# Patient Record
Sex: MALE | Race: WHITE | NOT HISPANIC OR LATINO | Employment: OTHER | URBAN - METROPOLITAN AREA
[De-identification: names, ages, dates, MRNs, and addresses within clinical notes are randomized per-mention and may not be internally consistent; named-entity substitution may affect disease eponyms.]

---

## 2021-08-18 PROBLEM — Z12.11 SCREENING FOR MALIGNANT NEOPLASM OF COLON: Status: ACTIVE | Noted: 2021-08-18

## 2021-08-18 PROBLEM — R15.1 FECAL SMEARING: Status: ACTIVE | Noted: 2021-08-18

## 2021-08-18 PROBLEM — R10.32 LEFT INGUINAL PAIN: Status: ACTIVE | Noted: 2021-08-18

## 2021-08-18 PROBLEM — Z80.0 FAMILY HISTORY OF COLON CANCER: Status: ACTIVE | Noted: 2021-08-18

## 2021-12-16 ENCOUNTER — EVALUATION (OUTPATIENT)
Dept: PHYSICAL THERAPY | Facility: CLINIC | Age: 66
End: 2021-12-16
Payer: COMMERCIAL

## 2021-12-16 DIAGNOSIS — G89.29 CHRONIC LOW BACK PAIN, UNSPECIFIED BACK PAIN LATERALITY, UNSPECIFIED WHETHER SCIATICA PRESENT: Primary | ICD-10-CM

## 2021-12-16 DIAGNOSIS — M54.50 CHRONIC LOW BACK PAIN, UNSPECIFIED BACK PAIN LATERALITY, UNSPECIFIED WHETHER SCIATICA PRESENT: Primary | ICD-10-CM

## 2021-12-16 PROCEDURE — 97162 PT EVAL MOD COMPLEX 30 MIN: CPT | Performed by: PHYSICAL THERAPIST

## 2021-12-16 PROCEDURE — 97110 THERAPEUTIC EXERCISES: CPT | Performed by: PHYSICAL THERAPIST

## 2021-12-21 ENCOUNTER — OFFICE VISIT (OUTPATIENT)
Dept: PHYSICAL THERAPY | Facility: CLINIC | Age: 66
End: 2021-12-21
Payer: COMMERCIAL

## 2021-12-21 DIAGNOSIS — G89.29 CHRONIC LOW BACK PAIN, UNSPECIFIED BACK PAIN LATERALITY, UNSPECIFIED WHETHER SCIATICA PRESENT: Primary | ICD-10-CM

## 2021-12-21 DIAGNOSIS — M54.50 CHRONIC LOW BACK PAIN, UNSPECIFIED BACK PAIN LATERALITY, UNSPECIFIED WHETHER SCIATICA PRESENT: Primary | ICD-10-CM

## 2021-12-21 PROCEDURE — 97110 THERAPEUTIC EXERCISES: CPT

## 2021-12-21 PROCEDURE — 97112 NEUROMUSCULAR REEDUCATION: CPT

## 2021-12-28 ENCOUNTER — OFFICE VISIT (OUTPATIENT)
Dept: PHYSICAL THERAPY | Facility: CLINIC | Age: 66
End: 2021-12-28
Payer: COMMERCIAL

## 2021-12-28 ENCOUNTER — APPOINTMENT (OUTPATIENT)
Dept: PHYSICAL THERAPY | Facility: CLINIC | Age: 66
End: 2021-12-28
Payer: COMMERCIAL

## 2021-12-28 DIAGNOSIS — G89.29 CHRONIC LOW BACK PAIN, UNSPECIFIED BACK PAIN LATERALITY, UNSPECIFIED WHETHER SCIATICA PRESENT: Primary | ICD-10-CM

## 2021-12-28 DIAGNOSIS — M54.50 CHRONIC LOW BACK PAIN, UNSPECIFIED BACK PAIN LATERALITY, UNSPECIFIED WHETHER SCIATICA PRESENT: Primary | ICD-10-CM

## 2021-12-28 PROCEDURE — 97112 NEUROMUSCULAR REEDUCATION: CPT

## 2021-12-28 PROCEDURE — 97110 THERAPEUTIC EXERCISES: CPT

## 2021-12-30 ENCOUNTER — OFFICE VISIT (OUTPATIENT)
Dept: PHYSICAL THERAPY | Facility: CLINIC | Age: 66
End: 2021-12-30
Payer: COMMERCIAL

## 2021-12-30 DIAGNOSIS — M54.50 CHRONIC LOW BACK PAIN, UNSPECIFIED BACK PAIN LATERALITY, UNSPECIFIED WHETHER SCIATICA PRESENT: Primary | ICD-10-CM

## 2021-12-30 DIAGNOSIS — G89.29 CHRONIC LOW BACK PAIN, UNSPECIFIED BACK PAIN LATERALITY, UNSPECIFIED WHETHER SCIATICA PRESENT: Primary | ICD-10-CM

## 2021-12-30 PROCEDURE — 97110 THERAPEUTIC EXERCISES: CPT

## 2021-12-30 PROCEDURE — 97112 NEUROMUSCULAR REEDUCATION: CPT

## 2022-01-03 ENCOUNTER — OFFICE VISIT (OUTPATIENT)
Dept: PHYSICAL THERAPY | Facility: CLINIC | Age: 67
End: 2022-01-03
Payer: COMMERCIAL

## 2022-01-03 DIAGNOSIS — G89.29 CHRONIC LOW BACK PAIN, UNSPECIFIED BACK PAIN LATERALITY, UNSPECIFIED WHETHER SCIATICA PRESENT: Primary | ICD-10-CM

## 2022-01-03 DIAGNOSIS — M54.50 CHRONIC LOW BACK PAIN, UNSPECIFIED BACK PAIN LATERALITY, UNSPECIFIED WHETHER SCIATICA PRESENT: Primary | ICD-10-CM

## 2022-01-03 PROCEDURE — 97110 THERAPEUTIC EXERCISES: CPT | Performed by: PHYSICAL THERAPIST

## 2022-01-03 PROCEDURE — 97112 NEUROMUSCULAR REEDUCATION: CPT | Performed by: PHYSICAL THERAPIST

## 2022-01-03 NOTE — PROGRESS NOTES
Daily Note     Today's date: 1/3/2022  Patient name: Mitul Bal  : 1955  MRN: [id number]  Referring provider: No ref  provider found  Dx:   Encounter Diagnosis     ICD-10-CM    1  Chronic low back pain, unspecified back pain laterality, unspecified whether sciatica present  M54 50     G89 29                   Subjective: Patient reports that he had some soreness on the w/e due to just "sitting around a lot"  He feels less discomfort with movement  Objective: See treatment diary below      Assessment: Tolerated treatment well  Patient would benefit from continued PT      Plan: Continue per plan of care  Precautions: Access Code: [email or web address]  URL: [email or web address]  Date: 2021  Prepared by: Eulalia Melendez    Exercises  Standing Lumbar Extension - 2 x daily - 7 x weekly - 1 sets - 5 reps - 5 hold  Prone Press Up on Elbows - 2 x daily - 7 x weekly - 1 sets - 10 reps - 10 hold        Manuals 12/16 12/21 12/28 12/30  1/3        JM R L5-S1 facet joint  MW TS MW 5'                                               Neuro Re-Ed                          SLS  10/10:  :10/10 10"   x 10  10 x :10        Side Stepping  5 laps  RTB 5 laps  RTB 5 laps  RTB 5 laps        CLamshells  2x10  RTB 2x10 GTB x 20 GTB x 20        PT  20x:05 20x:05  5" x 20 20 x :20        Piriformis stretch on R    5 x 15" 5 x :20        Bridge     10 x :03        PT with SLR     x10        Bike  6  min 6 min 6 min  6'                     Standing lumbar Extension - belt with OP 10 x :05 10x :05 prone 20x :05  Prone  Belt OP 1 x 10  X 10                     Prone Press ups 10 x :10 20x:10 20x :10 10" x 10  10 x :10        Open Book     nv                                  Ther Activity                                       Gait Training                                       Modalities

## 2022-01-05 ENCOUNTER — OFFICE VISIT (OUTPATIENT)
Dept: PHYSICAL THERAPY | Facility: CLINIC | Age: 67
End: 2022-01-05
Payer: COMMERCIAL

## 2022-01-05 DIAGNOSIS — G89.29 CHRONIC LOW BACK PAIN, UNSPECIFIED BACK PAIN LATERALITY, UNSPECIFIED WHETHER SCIATICA PRESENT: Primary | ICD-10-CM

## 2022-01-05 DIAGNOSIS — M54.50 CHRONIC LOW BACK PAIN, UNSPECIFIED BACK PAIN LATERALITY, UNSPECIFIED WHETHER SCIATICA PRESENT: Primary | ICD-10-CM

## 2022-01-05 PROCEDURE — 97112 NEUROMUSCULAR REEDUCATION: CPT

## 2022-01-05 PROCEDURE — 97110 THERAPEUTIC EXERCISES: CPT

## 2022-01-05 NOTE — PROGRESS NOTES
Daily Note     Today's date: 2022  Patient name: Logan Levy  : 1955  MRN: [id number]  Referring provider: No ref  provider found  Dx: No diagnosis found  Subjective: Patient denies change in status since last treatment  Slight discomfort with SLS on R       Objective: See treatment diary below      Assessment: Tolerated treatment well  Patient exhibited good technique with therapeutic exercises      Plan: Continue per plan of care  Precautions: Access Code: [email or web address]  URL: [email or web address]  Date: 2021  Prepared by: Kaiden Lowe    Exercises  Standing Lumbar Extension - 2 x daily - 7 x weekly - 1 sets - 5 reps - 5 hold  Prone Press Up on Elbows - 2 x daily - 7 x weekly - 1 sets - 10 reps - 10 hold        Manuals 12/16 12/21 12/28 12/30  1/3 1/5       JM R L5-S1 facet joint  MW TS MW 5'        (-) IASTM       5                                 Neuro Re-Ed                          SLS  10/10:  :10/10 10"   x 10  10 x :10 10" x 10        Side Stepping  5 laps  RTB 5 laps  RTB 5 laps  RTB 5 laps RTB 5 laps        CLamshells  2x10  RTB 2x10 GTB x 20 GTB x 20 GTB x 20        PT  20x:05 20x:05  5" x 20 20 x :20 5" x 20        Piriformis stretch on R    5 x 15" 5 x :20 5 x 20"        Bridge     10 x :03 3"x20        PT with SLR     x10        Bike  6  min 6 min 6 min  6' 6                    Standing lumbar Extension - belt with OP 10 x :05 10x :05 prone 20x :05  Prone  Belt OP 1 x 10  X 10 Belt OP x 10                     Prone Press ups 10 x :10 20x:10 20x :10 10" x 10  10 x :10 10" x 10       Open Book     nv R 10" x 5                                 Ther Activity                                       Gait Training                                       Modalities

## 2022-01-10 ENCOUNTER — OFFICE VISIT (OUTPATIENT)
Dept: PHYSICAL THERAPY | Facility: CLINIC | Age: 67
End: 2022-01-10
Payer: COMMERCIAL

## 2022-01-10 DIAGNOSIS — G89.29 CHRONIC LOW BACK PAIN, UNSPECIFIED BACK PAIN LATERALITY, UNSPECIFIED WHETHER SCIATICA PRESENT: Primary | ICD-10-CM

## 2022-01-10 DIAGNOSIS — M54.50 CHRONIC LOW BACK PAIN, UNSPECIFIED BACK PAIN LATERALITY, UNSPECIFIED WHETHER SCIATICA PRESENT: Primary | ICD-10-CM

## 2022-01-10 PROCEDURE — 97112 NEUROMUSCULAR REEDUCATION: CPT

## 2022-01-10 PROCEDURE — 97110 THERAPEUTIC EXERCISES: CPT

## 2022-01-10 NOTE — PROGRESS NOTES
Daily Note     Today's date: 1/10/2022  Patient name: Sean Harding  : 1955  MRN: [id number]  Referring provider: No ref  provider found  Dx:   Encounter Diagnosis     ICD-10-CM    1  Chronic low back pain, unspecified back pain laterality, unspecified whether sciatica present  M54 50     G89 29                   Subjective: Patient states that he was able to walk 2 miles over the weekend with minimal radiating paraesthesia at the end of his activity       Objective: See treatment diary below      Assessment: Tolerated treatment well  Patient exhibited good technique with therapeutic exercises      Plan: Continue per plan of care  Precautions: Access Code: [email or web address]  URL: [email or web address]  Date: 2021  Prepared by: Rhoda Lively    Exercises  Standing Lumbar Extension - 2 x daily - 7 x weekly - 1 sets - 5 reps - 5 hold  Prone Press Up on Elbows - 2 x daily - 7 x weekly - 1 sets - 10 reps - 10 hold        Manuals 12/16 12/21 12/28 12/30  1/3 1/5 1/10       JM R L5-S1 facet joint  MW TS MW 5'        (-) IASTM       5 5                                Neuro Re-Ed                          SLS  10/10:  :10/10 10"   x 10  10 x :10 10" x 10  10" x 10       Side Stepping  5 laps  RTB 5 laps  RTB 5 laps  RTB 5 laps RTB 5 laps  GTB 5 laps      CLamshells  2x10  RTB 2x10 GTB x 20 GTB x 20 GTB x 20  GTB x      PT  20x:05 20x:05  5" x 20 20 x :20 5" x 20  5" x20       Piriformis stretch on R    5 x 15" 5 x :20 5 x 20"  5 x 20"      Bridge     10 x :03 3"x20  5 " x20       PT with SLR     x10  2 x 10       Bike  6  min 6 min 6 min  6' 6 6                   Standing lumbar Extension - belt with OP 10 x :05 10x :05 prone 20x :05  Prone  Belt OP 1 x 10  X 10 Belt OP x 10  No belt 2 x10                   Prone Press ups 10 x :10 20x:10 20x :10 10" x 10  10 x :10 10" x 10 10" x 10       Open Book     nv R 10" x 5 R 10"x 5                                 Ther Activity Gait Training                                       Modalities

## 2022-01-12 ENCOUNTER — OFFICE VISIT (OUTPATIENT)
Dept: PHYSICAL THERAPY | Facility: CLINIC | Age: 67
End: 2022-01-12
Payer: COMMERCIAL

## 2022-01-12 DIAGNOSIS — G89.29 CHRONIC LOW BACK PAIN, UNSPECIFIED BACK PAIN LATERALITY, UNSPECIFIED WHETHER SCIATICA PRESENT: Primary | ICD-10-CM

## 2022-01-12 DIAGNOSIS — M54.50 CHRONIC LOW BACK PAIN, UNSPECIFIED BACK PAIN LATERALITY, UNSPECIFIED WHETHER SCIATICA PRESENT: Primary | ICD-10-CM

## 2022-01-12 PROCEDURE — 97110 THERAPEUTIC EXERCISES: CPT

## 2022-01-12 NOTE — PROGRESS NOTES
Daily Note     Today's date: 2022  Patient name: Sean Harding  : 1955  MRN: [id number]  Referring provider: No ref  provider found  Dx:   Encounter Diagnosis     ICD-10-CM    1  Chronic low back pain, unspecified back pain laterality, unspecified whether sciatica present  M54 50     G89 29                   Subjective: Some LLE pain following recreational walk  Objective: See treatment diary below      Assessment: Tolerated treatment well  Patient exhibited good technique with therapeutic exercises      Plan: Continue per plan of care  Precautions: Access Code: [email or web address]  URL: [email or web address]  Date: 2021  Prepared by: Rhoda Lively    Exercises  Standing Lumbar Extension - 2 x daily - 7 x weekly - 1 sets - 5 reps - 5 hold  Prone Press Up on Elbows - 2 x daily - 7 x weekly - 1 sets - 10 reps - 10 hold        Manuals 12/16 12/21 12/28 12/30  1/3 1/5 1/10  1/12     JM R L5-S1 facet joint  MW TS MW 5'   5 MW     (-) IASTM       5 5 5                               Neuro Re-Ed             Paloff press        R x 10      SLS  10/10:  :10/10 10"   x 10  10 x :10 10" x 10  10" x 10  10" x 10      Side Stepping  5 laps  RTB 5 laps  RTB 5 laps  RTB 5 laps RTB 5 laps  GTB 5 laps      CLamshells  2x10  RTB 2x10 GTB x 20 GTB x 20 GTB x 20  GTB x GTB x 20      PT  20x:05 20x:05  5" x 20 20 x :20 5" x 20  5" x20       Piriformis stretch on R    5 x 15" 5 x :20 5 x 20"  5 x 20" 20" x5      Bridge     10 x :03 3"x20  5 " x20  5" x 20     PT with SLR     x10  2 x 10  NV     Bike  6  min 6 min 6 min  6' 6 6 6                  Standing lumbar Extension - belt with OP 10 x :05 10x :05 prone 20x :05  Prone  Belt OP 1 x 10  X 10 Belt OP x 10  No belt 2 x10 No belt c 20                  Prone Press ups 10 x :10 20x:10 20x :10 10" x 10  10 x :10 10" x 10 10" x 10  10"x 10      Open Book     nv R 10" x 5 R 10"x 5                                 Ther Activity Gait Training                                       Modalities

## 2022-01-17 ENCOUNTER — OFFICE VISIT (OUTPATIENT)
Dept: PHYSICAL THERAPY | Facility: CLINIC | Age: 67
End: 2022-01-17
Payer: COMMERCIAL

## 2022-01-17 DIAGNOSIS — G89.29 CHRONIC LOW BACK PAIN, UNSPECIFIED BACK PAIN LATERALITY, UNSPECIFIED WHETHER SCIATICA PRESENT: Primary | ICD-10-CM

## 2022-01-17 DIAGNOSIS — M54.50 CHRONIC LOW BACK PAIN, UNSPECIFIED BACK PAIN LATERALITY, UNSPECIFIED WHETHER SCIATICA PRESENT: Primary | ICD-10-CM

## 2022-01-17 PROCEDURE — 97112 NEUROMUSCULAR REEDUCATION: CPT | Performed by: PHYSICAL THERAPIST

## 2022-01-17 PROCEDURE — 97110 THERAPEUTIC EXERCISES: CPT | Performed by: PHYSICAL THERAPIST

## 2022-01-17 NOTE — PROGRESS NOTES
Daily Note     Today's date: 2022  Patient name: Ray Barakat  : 1955  MRN: [id number]  Referring provider: No ref  provider found  Dx:   Encounter Diagnosis     ICD-10-CM    1  Chronic low back pain, unspecified back pain laterality, unspecified whether sciatica present  M54 50     G89 29                   Subjective: Patient reports that he has some L hip discomfort and mild tingling in his R toes when he sits in the car  Objective: See treatment diary below      Assessment: Tolerated treatment well  Patient would benefit from continued PT      Plan: Continue per plan of care  Precautions: Access Code: [email or web address]  URL: [email or web address]  Date: 2021  Prepared by: Denver Doe    Exercises  Standing Lumbar Extension - 2 x daily - 7 x weekly - 1 sets - 5 reps - 5 hold  Prone Press Up on Elbows - 2 x daily - 7 x weekly - 1 sets - 10 reps - 10 hold        Manuals 12/16 12/21 12/28 12/30  1/3 1/5 1/10  1/12 1/17    JM R L5-S1 facet joint  MW TS MW 5'   5 MW     (-) IASTM  L and R      5 5 5 6'                              Neuro Re-Ed             Paloff press        R x 10  RTB x 15 ea    SLS  10/10:  :10/10 10"   x 10  10 x :10 10" x 10  10" x 10  10" x 10  10 x :10    Side Stepping  5 laps  RTB 5 laps  RTB 5 laps  RTB 5 laps RTB 5 laps  GTB 5 laps  GTB 5 laps     CLamshells  2x10  RTB 2x10 GTB x 20 GTB x 20 GTB x 20  GTB x GTB x 20  GTB x 20    PT  20x:05 20x:05  5" x 20 20 x :20 5" x 20  5" x20       Piriformis stretch on R    5 x 15" 5 x :20 5 x 20"  5 x 20" 20" x5  5 x :20    Bridge     10 x :03 3"x20  5 " x20  5" x 20 20 x :05    PT with SLR     x10  2 x 10  NV     Bike  6  min 6 min 6 min  6' 6 6 6 6'                 Standing lumbar Extension - belt with OP 10 x :05 10x :05 prone 20x :05  Prone  Belt OP 1 x 10  X 10 Belt OP x 10  No belt 2 x10 No belt c 20 20                 Prone Press ups 10 x :10 20x:10 20x :10 10" x 10  10 x :10 10" x 10 10" x 10  10"x 10  10 x :10    Open Book     nv R 10" x 5  10"x 5 5 x :20 5 x :20                               Ther Activity                                       Gait Training                                       Modalities

## 2022-01-19 ENCOUNTER — OFFICE VISIT (OUTPATIENT)
Dept: PHYSICAL THERAPY | Facility: CLINIC | Age: 67
End: 2022-01-19
Payer: COMMERCIAL

## 2022-01-19 DIAGNOSIS — G89.29 CHRONIC LOW BACK PAIN, UNSPECIFIED BACK PAIN LATERALITY, UNSPECIFIED WHETHER SCIATICA PRESENT: Primary | ICD-10-CM

## 2022-01-19 DIAGNOSIS — M54.50 CHRONIC LOW BACK PAIN, UNSPECIFIED BACK PAIN LATERALITY, UNSPECIFIED WHETHER SCIATICA PRESENT: Primary | ICD-10-CM

## 2022-01-19 PROCEDURE — 97112 NEUROMUSCULAR REEDUCATION: CPT

## 2022-01-19 PROCEDURE — 97140 MANUAL THERAPY 1/> REGIONS: CPT

## 2022-01-19 PROCEDURE — 97110 THERAPEUTIC EXERCISES: CPT

## 2022-01-19 NOTE — PROGRESS NOTES
Daily Note     Today's date: 2022  Patient name: Mitzi Tidwell  : 1955  MRN: [id number]  Referring provider: No ref  provider found  Dx:   Encounter Diagnosis     ICD-10-CM    1  Chronic low back pain, unspecified back pain laterality, unspecified whether sciatica present  M54 50     G89 29                   Subjective: Patient states that he fell on ice yesterday onto his back  Patient states that he is sore since incident but no further complaints  Objective: See treatment diary below      Assessment: Tolerated treatment well  Patient exhibited good technique with therapeutic exercises      Plan: Continue per plan of care  Precautions: Access Code: [email or web address]  URL: [email or web address]  Date: 2021  Prepared by: Prema Austin    Exercises  Standing Lumbar Extension - 2 x daily - 7 x weekly - 1 sets - 5 reps - 5 hold  Prone Press Up on Elbows - 2 x daily - 7 x weekly - 1 sets - 10 reps - 10 hold        Manuals 12/16 12/21 12/28 12/30  1/3 1/5 1/10  1/12 1/17 1/19   JM R L5-S1 facet joint  MW TS MW 5'   5 MW     (-) IASTM  L and R      5 5 5 6' 10                              Neuro Re-Ed             Paloff press        R x 10  RTB x 15 ea NV   SLS  10/10:  :10/10 10"   x 10  10 x :10 10" x 10  10" x 10  10" x 10  10 x :10 10" x 10    Side Stepping  5 laps  RTB 5 laps  RTB 5 laps  RTB 5 laps RTB 5 laps  GTB 5 laps  GTB 5 laps  G 5 laps    CLamshells  2x10  RTB 2x10 GTB x 20 GTB x 20 GTB x 20  GTB x GTB x 20  GTB x 20 GTB x 20    PT  20x:05 20x:05  5" x 20 20 x :20 5" x 20  5" x20       Piriformis stretch on R    5 x 15" 5 x :20 5 x 20"  5 x 20" 20" x5  5 x :20 5 x 20"   Bridge     10 x :03 3"x20  5 " x20  5" x 20 20 x :05 5" x 20    PT with SLR     x10  2 x 10  NV     Bike  6  min 6 min 6 min  6' 6 6 6 6' 6                Standing lumbar Extension - belt with OP 10 x :05 10x :05 prone 20x :05  Prone  Belt OP 1 x 10  X 10 Belt OP x 10  No belt 2 x10 No belt c 20 20 20 Prone Press ups 10 x :10 20x:10 20x :10 10" x 10  10 x :10 10" x 10 10" x 10  10"x 10  10 x :10 10" x 10    Open Book     nv R 10" x 5  10"x 5 5 x :20 5 x :20 5 x 20"                              Ther Activity                                       Gait Training                                       Modalities

## 2022-01-24 ENCOUNTER — OFFICE VISIT (OUTPATIENT)
Dept: PHYSICAL THERAPY | Facility: CLINIC | Age: 67
End: 2022-01-24
Payer: COMMERCIAL

## 2022-01-24 DIAGNOSIS — M54.50 CHRONIC LOW BACK PAIN, UNSPECIFIED BACK PAIN LATERALITY, UNSPECIFIED WHETHER SCIATICA PRESENT: Primary | ICD-10-CM

## 2022-01-24 DIAGNOSIS — G89.29 CHRONIC LOW BACK PAIN, UNSPECIFIED BACK PAIN LATERALITY, UNSPECIFIED WHETHER SCIATICA PRESENT: Primary | ICD-10-CM

## 2022-01-24 PROCEDURE — 97112 NEUROMUSCULAR REEDUCATION: CPT | Performed by: PHYSICAL THERAPIST

## 2022-01-24 PROCEDURE — 97110 THERAPEUTIC EXERCISES: CPT | Performed by: PHYSICAL THERAPIST

## 2022-01-24 PROCEDURE — 97140 MANUAL THERAPY 1/> REGIONS: CPT | Performed by: PHYSICAL THERAPIST

## 2022-01-24 NOTE — PROGRESS NOTES
Daily Note     Today's date: 2022  Patient name: Phoebe Strong  : 1955  MRN: [id number]  Referring provider: No ref  provider found  Dx:   Encounter Diagnosis     ICD-10-CM    1  Chronic low back pain, unspecified back pain laterality, unspecified whether sciatica present  M54 50     G89 29                   Subjective: Patient reports that he has less LBP from fall however, he did shovel snow today  Also notes L hip continues to be painful  Objective: See treatment diary below  Supervised by AMARIS, PTA x 20'      Assessment: Tolerated treatment well  Patient would benefit from continued PT       Plan: Continue per plan of care  Precautions: Access Code: [email or web address]  URL: [email or web address]  Date: 2021  Prepared by: Albert Almendarez    Exercises  Standing Lumbar Extension - 2 x daily - 7 x weekly - 1 sets - 5 reps - 5 hold  Prone Press Up on Elbows - 2 x daily - 7 x weekly - 1 sets - 10 reps - 10 hold        Manuals 1/24 12/21 12/28 12/30  1/3 1/5 1/10  1/12 1/17 1/19   JM R L5-S1 facet joint  MW TS MW 5'   5 MW     (-) IASTM  L and R 9'     5 5 5 6' 10                              Neuro Re-Ed             Paloff press 15 ea RTB       R x 10  RTB x 15 ea NV   SLS 10 x :10 10/10:  :10/10 10"   x 10  10 x :10 10" x 10  10" x 10  10" x 10  10 x :10 10" x 10    Side Stepping GTB 5 laps 5 laps  RTB 5 laps  RTB 5 laps  RTB 5 laps RTB 5 laps  GTB 5 laps  GTB 5 laps  G 5 laps    CLamshells GTB x 20 2x10  RTB 2x10 GTB x 20 GTB x 20 GTB x 20  GTB x GTB x 20  GTB x 20 GTB x 20    PT  20x:05 20x:05  5" x 20 20 x :20 5" x 20  5" x20       Piriformis stretch on R    5 x 15" 5 x :20 5 x 20"  5 x 20" 20" x5  5 x :20 5 x 20"   Bridge 2x10    10 x :03 3"x20  5 " x20  5" x 20 20 x :05 5" x 20    PT with SLR     x10  2 x 10  NV     Bike 6' 6  min 6 min 6 min  6' 6 6 6 6' 6                Standing lumbar Extension - belt with OP 10 x :05 10x :05 prone 20x :05  Prone  Belt OP 1 x 10  X 10 Belt OP x 10  No belt 2 x10 No belt c 20 20 20                 Prone Press ups 10 x :10 20x:10 20x :10 10" x 10  10 x :10 10" x 10 10" x 10  10"x 10  10 x :10 10" x 10    Open Book 5 x :20    nv R 10" x 5  10"x 5 5 x :20 5 x :20 5 x 20"                              Ther Activity                                       Gait Training                                       Modalities

## 2022-01-26 ENCOUNTER — OFFICE VISIT (OUTPATIENT)
Dept: PHYSICAL THERAPY | Facility: CLINIC | Age: 67
End: 2022-01-26
Payer: COMMERCIAL

## 2022-01-26 DIAGNOSIS — G89.29 CHRONIC LOW BACK PAIN, UNSPECIFIED BACK PAIN LATERALITY, UNSPECIFIED WHETHER SCIATICA PRESENT: Primary | ICD-10-CM

## 2022-01-26 DIAGNOSIS — M54.50 CHRONIC LOW BACK PAIN, UNSPECIFIED BACK PAIN LATERALITY, UNSPECIFIED WHETHER SCIATICA PRESENT: Primary | ICD-10-CM

## 2022-01-26 PROCEDURE — 97140 MANUAL THERAPY 1/> REGIONS: CPT | Performed by: PHYSICAL THERAPIST

## 2022-01-26 PROCEDURE — 97112 NEUROMUSCULAR REEDUCATION: CPT | Performed by: PHYSICAL THERAPIST

## 2022-01-26 NOTE — LETTER
2022    [phone]    Patient: Mitzi Tidwell   YOB: 1955   Date of Visit: 2022     Encounter Diagnosis     ICD-10-CM    1  Chronic low back pain, unspecified back pain laterality, unspecified whether sciatica present  M54 50     G89 29        Dear Dr Gosia Goodwin: Thank you for your recent referral of Mitzi Tidwell  Please review the attached evaluation summary from [address] [place] [address] recent visit  Please verify that you agree with the plan of care by signing the attached order  If you have any questions or concerns, please do not hesitate to call  I sincerely appreciate the opportunity to share in the care of one of your patients and hope to have another opportunity to work with you in the near future  Sincerely,    Denita Huston, PT      Referring Provider:      I certify that I have read the below Plan of Care and certify the need for these services furnished under this plan of treatment while under my care  Verónica Kathleen MD  [address] [place] [address]  Via Fax: [phone]          Daily Note     Today's date: 2022  Patient name: Mitzi Tidwell  : 1955  MRN: [id number]  Referring provider: No ref  provider found  Dx:   Encounter Diagnosis     ICD-10-CM    1  Chronic low back pain, unspecified back pain laterality, unspecified whether sciatica present  M54 50     G89 29                   Subjective: See REV      Objective: See treatment diary below      Assessment: Tolerated treatment well  Patient would benefit from continued PT      Plan: Continue per plan of care  Precautions: Access Code: [email or web address]  URL: [email or web address]  Date: 2021  Prepared by: Prema Austin    Exercises  Standing Lumbar Extension - 2 x daily - 7 x weekly - 1 sets - 5 reps - 5 hold  Prone Press Up on Elbows - 2 x daily - 7 x weekly - 1 sets - 10 reps - 10 hold        Manuals  JM R L5-S1 facet joint  MW           (-) IASTM  L and R 9' 6'           REV  10                        Neuro Re-Ed             Paloff press 15 ea RTB RTB x 20           SLS 10 x :10 10/10:            Side Stepping GTB 5 laps 5 laps            CLamshells GTB x 20 2x10 GTB           PT  20x:05           Piriformis stretch on R             Bridge 2x10 10 x GTB :05           PT with SLR  2 x 10           Bike 6' 6  min           Hamstring st  5 x :20           Standing lumbar Extension - belt with OP 10 x :05 X 20                        Prone Press ups 10 x :10 20x:10           Open Book 5 x :20 5 x :20                                      Ther Activity                                       Gait Training                                       Modalities                                              PT Evaluation     Today's date: 22  Patient name: Lindsey Freeman  : 1955  MRN: [id number]  Referring provider: No ref  provider found  Dx:   Encounter Diagnosis     ICD-10-CM    1  Chronic low back pain, unspecified back pain laterality, unspecified whether sciatica present  M54 50     G89 29                   Assessment  Assessment details: Lindsey Freeman is a 73 y o  male with Chronic low back pain possibly related to facet joint irritation on the R  He presents with pain, decreased strength, decreased ROM, impaired sensation, ambulatory dysfunction and postural  dysfunction  Due to these impairments, Patient has difficulty performing a/iadls, recreational activities and engaging in social activities  Patient's clinical presentation is consistent with their referring diagnosis  Patient would benefit from skilled physical therapy to address their aforementioned impairments, improve their level of function and to improve their overall quality of life   has been given a home exercise program and is in agreement with the plan of care    Thank you for your referral   Impairments: abnormal or restricted ROM, impaired balance, lacks appropriate home exercise program and pain with function  Understanding of Dx/Px/POC: excellent  Goals  ST Goals - 2-4 weeks  1  Patient will report decreased pain with activity by at least 2 points within 4 weeks  2  Patient will improve ROM 5-10 degrees within 4 weeks  3  Patient will abolish radicular sx in 2 weeks  4  Patient will perform IADLs without pain in 2 weeks  5  Patient will increase strength by 25% in 4 weeks    LT Goals - Discharge  1  Patient will improve FOTO score to maximum stated or greater by discharge  2  Patient will return to preferred recreational activity without significant pain increase by discharge   3  Patient will return to all house work related activities without pain by discharge     Plan  Patient would benefit from: skilled physical therapy  Referral necessary: Yes  Planned therapy interventions: strengthening, stretching, therapeutic activities, therapeutic exercise, home exercise program, balance, joint mobilization and manual therapy  Frequency: 2x week  Duration in visits: 20  Duration in weeks: 20  Plan of Care beginning date: 12/16/2021  Plan of Care expiration date: 3/16/2022  Treatment plan discussed with: patient        Subjective Evaluation    History of Present Illness  Mechanism of injury: Patient reports that he has had off and on LBP since he was in his 25s but approx 1 5 ago he began having insidious onset of LBP  He states that he went for a walk and that he got out of his car he felt pain  He had attempted to jog during that walk and went approx   5 miles  CC:  He reports R sided LBP  He reports that he has no pain with sitting at this time  He notes having a "numbness" in his foot when he is driving longer distances - for 39' or more  He also notes the numbness when he has walked approx 1-1 5 mile  He indicates sx are in the medial arch area and into the great toe  Sx resolve very quickly    PMH:  L femur fx and meniscus surgery - 2 years ago; Function:  He is retired  He works out everyday  He notes that sit to stand is most painful  He denies pain at night or bowel and bladder changes  He continues to try to do a daily walk/jog 5 days a week  2 miles a day          Recurrent probem    Quality of life: excellent    Pain  Current pain ratin / 0/10  At best pain ratin  At worst pain ratin  / 3/10  Quality: sharp  Relieving factors: heat and medications  Aggravating factors: walking and standing  Progression: no change    Social Support  Steps to enter house: no  Stairs in house: yes   Lives in: [place]  Lives with: spouse      Diagnostic Tests  X-ray: abnormal    FCE comments: Spondylitic changes at [id number] Goals  Patient goals for therapy: return to work, increased motion, decreased pain, independence with ADLs/IADLs and return to sport/leisure activities          Objective     Static Posture     Lumbar Spine   Decreased lordosis  Pelvis   Pelvis (Right): Elevated  Palpation     Right   Tenderness of the quadratus lumborum  Tenderness     Lumbar Spine  Tenderness in the facet joint  Additional Tenderness Details  L5-S1 R facet joint tenderness    Active Range of Motion     Lumbar   Flexion: 90 degrees    Extension: 15 degrees   Left lateral flexion: 22 degrees  /    20 degrees      Right lateral flexion: 15 degrees  with pain  / 22 degrees    Additional Active Range of Motion Details  Patient notes tingling  In foot with standing extension    Strength/Myotome Testing     Right Ankle/Foot   Great toe flexion: 4+    Tests     Lumbar     Right   Negative slump test      Left Hip   Positive CLAUDIA  Right Hip   Positive CLAUDIA  Additional Tests Details  LBP with Joe Tiffany    Ambulation     Observational Gait     Additional Observational Gait Details  Patient with antalgia due to previous L femur ORIF      Patient appears to have a + trendelenberg      Functional Assessment        Single Leg Stance - Eyes Open   Left  Trial 1: 5 seconds    /  10 sec    Right  Trial 1: 6 seconds   /  10 sec    General Comments:    Lower quarter screen   Hips: unremarkable  Knees: unremarkable  Foot/ankle: unremarkable    Ankle/Foot Comments   Great toe on R:  4+/5

## 2022-01-26 NOTE — PROGRESS NOTES
PT Evaluation     Today's date: 22  Patient name: Janessa Schreiber  : 1955  MRN: [id number]  Referring provider: No ref  provider found  Dx:   Encounter Diagnosis     ICD-10-CM    1  Chronic low back pain, unspecified back pain laterality, unspecified whether sciatica present  M54 50     G89 29                   Assessment  Assessment details: Janessa Schreiber is a 73 y o  male with Chronic low back pain possibly related to facet joint irritation on the R  He presents with pain, decreased strength, decreased ROM, impaired sensation, ambulatory dysfunction and postural  dysfunction  Due to these impairments, Patient has difficulty performing a/iadls, recreational activities and engaging in social activities  Patient's clinical presentation is consistent with their referring diagnosis  Patient would benefit from skilled physical therapy to address their aforementioned impairments, improve their level of function and to improve their overall quality of life   has been given a home exercise program and is in agreement with the plan of care  Thank you for your referral   Impairments: abnormal or restricted ROM, impaired balance, lacks appropriate home exercise program and pain with function  Understanding of Dx/Px/POC: excellent  Goals  ST Goals - 2-4 weeks  1  Patient will report decreased pain with activity by at least 2 points within 4 weeks  2  Patient will improve ROM 5-10 degrees within 4 weeks  3  Patient will abolish radicular sx in 2 weeks  4  Patient will perform IADLs without pain in 2 weeks  5  Patient will increase strength by 25% in 4 weeks    LT Goals - Discharge  1  Patient will improve FOTO score to maximum stated or greater by discharge  2  Patient will return to preferred recreational activity without significant pain increase by discharge   3    Patient will return to all house work related activities without pain by discharge     Plan  Patient would benefit from: skilled physical therapy  Referral necessary: Yes  Planned therapy interventions: strengthening, stretching, therapeutic activities, therapeutic exercise, home exercise program, balance, joint mobilization and manual therapy  Frequency: 2x week  Duration in visits: 20  Duration in weeks: 20  Plan of Care beginning date: 2021  Plan of Care expiration date: 3/16/2022  Treatment plan discussed with: patient        Subjective Evaluation    History of Present Illness  Mechanism of injury: Patient reports that he has had off and on LBP since he was in his 25s but approx 1 5 ago he began having insidious onset of LBP  He states that he went for a walk and that he got out of his car he felt pain  He had attempted to jog during that walk and went approx   5 miles  CC:  He reports R sided LBP  He reports that he has no pain with sitting at this time  He notes having a "numbness" in his foot when he is driving longer distances - for 39' or more  He also notes the numbness when he has walked approx 1-1 5 mile  He indicates sx are in the medial arch area and into the great toe  Sx resolve very quickly  PMH:  L femur fx and meniscus surgery - 2 years ago; Function:  He is retired  He works out everyday  He notes that sit to stand is most painful  He denies pain at night or bowel and bladder changes      He continues to try to do a daily walk/jog 5 days a week  2 miles a day          Recurrent probem    Quality of life: excellent    Pain  Current pain ratin / 0/10  At best pain ratin  At worst pain ratin  / 3/10  Quality: sharp  Relieving factors: heat and medications  Aggravating factors: walking and standing  Progression: no change    Social Support  Steps to enter house: no  Stairs in house: yes   Lives in: [facility]  Lives with: spouse      Diagnostic Tests  X-ray: abnormal    FCE comments: Spondylitic changes at L5-[id number] Goals  Patient goals for therapy: return to work, increased motion, decreased pain, independence with ADLs/IADLs and return to sport/leisure activities          Objective     Static Posture     Lumbar Spine   Decreased lordosis  Pelvis   Pelvis (Right): Elevated  Palpation     Right   Tenderness of the quadratus lumborum  Tenderness     Lumbar Spine  Tenderness in the facet joint  Additional Tenderness Details  L5-S1 R facet joint tenderness    Active Range of Motion     Lumbar   Flexion: 90 degrees    Extension: 15 degrees   Left lateral flexion: 22 degrees  /    20 degrees      Right lateral flexion: 15 degrees  with pain  / 22 degrees    Additional Active Range of Motion Details  Patient notes tingling  In foot with standing extension    Strength/Myotome Testing     Right Ankle/Foot   Great toe flexion: 4+    Tests     Lumbar     Right   Negative slump test      Left Hip   Positive CLAUDIA  Right Hip   Positive CLAUDIA  Additional Tests Details  LBP with Marielos Rashel    Ambulation     Observational Gait     Additional Observational Gait Details  Patient with antalgia due to previous L femur ORIF      Patient appears to have a + trendelenberg      Functional Assessment        Single Leg Stance - Eyes Open   Left  Trial 1: 5 seconds    /  10 sec    Right  Trial 1: 6 seconds   /  10 sec    General Comments:    Lower quarter screen   Hips: unremarkable  Knees: unremarkable  Foot/ankle: unremarkable    Ankle/Foot Comments   Great toe on R:  4+/5

## 2022-01-26 NOTE — LETTER
2022    [phone]    Patient: Shin Kyle   YOB: 1955   Date of Visit: 2022     Encounter Diagnosis     ICD-10-CM    1  Chronic low back pain, unspecified back pain laterality, unspecified whether sciatica present  M54 50     G89 29        Dear Dr Ashley Mendez: Thank you for your recent referral of Shin Kyle  Please review the attached evaluation summary from [address] [place] [address] recent visit  Please verify that you agree with the plan of care by signing the attached order  If you have any questions or concerns, please do not hesitate to call  I sincerely appreciate the opportunity to share in the care of one of your patients and hope to have another opportunity to work with you in the near future  Sincerely,    Radha Corbin, PT      Referring Provider:      I certify that I have read the below Plan of Care and certify the need for these services furnished under this plan of treatment while under my care  Iain Phan MD  [address] [place] [id number]  Via Fax: [phone]          Daily Note     Today's date: 2022  Patient name: Shin Kyle  : 1955  MRN: [id number]  Referring provider: No ref  provider found  Dx:   Encounter Diagnosis     ICD-10-CM    1  Chronic low back pain, unspecified back pain laterality, unspecified whether sciatica present  M54 50     G89 29                   Subjective: See REV      Objective: See treatment diary below      Assessment: Tolerated treatment well  Patient would benefit from continued PT      Plan: Continue per plan of care  Precautions: Access Code: [email or web address]  URL: [email or web address]  Date: 2021  Prepared by: Petrona Colin    Exercises  Standing Lumbar Extension - 2 x daily - 7 x weekly - 1 sets - 5 reps - 5 hold  Prone Press Up on Elbows - 2 x daily - 7 x weekly - 1 sets - 10 reps - 10 hold        Manuals  JM R L5-S1 facet joint  MW           (-) IASTM  L and R 9' 6'           REV  10                        Neuro Re-Ed             Paloff press 15 ea RTB RTB x 20           SLS 10 x :10 10/10:            Side Stepping GTB 5 laps 5 laps            CLamshells GTB x 20 2x10 GTB           PT  20x:05           Piriformis stretch on R             Bridge 2x10 10 x GTB :05           PT with SLR  2 x 10           Bike 6' 6  min           Hamstring st  5 x :20           Standing lumbar Extension - belt with OP 10 x :05 X 20                        Prone Press ups 10 x :10 20x:10           Open Book 5 x :20 5 x :20                                      Ther Activity                                       Gait Training                                       Modalities                                              PT Evaluation     Today's date: 22  Patient name: Janessa Schreiber  : 1955  MRN: [id number]  Referring provider: No ref  provider found  Dx:   Encounter Diagnosis     ICD-10-CM    1  Chronic low back pain, unspecified back pain laterality, unspecified whether sciatica present  M54 50     G89 29                   Assessment  Assessment details: Janessa Schreiber is a 73 y o  male with Chronic low back pain possibly related to facet joint irritation on the R  He presents with pain, decreased strength, decreased ROM, impaired sensation, ambulatory dysfunction and postural  dysfunction  Due to these impairments, Patient has difficulty performing a/iadls, recreational activities and engaging in social activities  Patient's clinical presentation is consistent with their referring diagnosis  Patient would benefit from skilled physical therapy to address their aforementioned impairments, improve their level of function and to improve their overall quality of life   has been given a home exercise program and is in agreement with the plan of care    Thank you for your referral   Impairments: abnormal or restricted ROM, impaired balance, lacks appropriate home exercise program and pain with function  Understanding of Dx/Px/POC: excellent  Goals  ST Goals - 2-4 weeks  1  Patient will report decreased pain with activity by at least 2 points within 4 weeks  2  Patient will improve ROM 5-10 degrees within 4 weeks  3  Patient will abolish radicular sx in 2 weeks  4  Patient will perform IADLs without pain in 2 weeks  5  Patient will increase strength by 25% in 4 weeks    LT Goals - Discharge  1  Patient will improve FOTO score to maximum stated or greater by discharge  2  Patient will return to preferred recreational activity without significant pain increase by discharge   3  Patient will return to all house work related activities without pain by discharge     Plan  Patient would benefit from: skilled physical therapy  Referral necessary: Yes  Planned therapy interventions: strengthening, stretching, therapeutic activities, therapeutic exercise, home exercise program, balance, joint mobilization and manual therapy  Frequency: 2x week  Duration in visits: 20  Duration in weeks: 20  Plan of Care beginning date: 12/16/2021  Plan of Care expiration date: 3/16/2022  Treatment plan discussed with: patient        Subjective Evaluation    History of Present Illness  Mechanism of injury: Patient reports that he has had off and on LBP since he was in his 25s but approx 1 5 ago he began having insidious onset of LBP  He states that he went for a walk and that he got out of his car he felt pain  He had attempted to jog during that walk and went approx   5 miles  CC:  He reports R sided LBP  He reports that he has no pain with sitting at this time  He notes having a "numbness" in his foot when he is driving longer distances - for 39' or more  He also notes the numbness when he has walked approx 1-1 5 mile  He indicates sx are in the medial arch area and into the great toe  Sx resolve very quickly    PMH:  L femur fx and meniscus surgery - 2 years ago; Function:  He is retired  He works out everyday  He notes that sit to stand is most painful  He denies pain at night or bowel and bladder changes  He continues to try to do a daily walk/jog 5 days a week  2 miles a day          Recurrent probem    Quality of life: excellent    Pain  Current pain ratin / 0/10  At best pain ratin  At worst pain ratin  / 3/10  Quality: sharp  Relieving factors: heat and medications  Aggravating factors: walking and standing  Progression: no change    Social Support  Steps to enter house: no  Stairs in house: yes   Lives in: [facility]  Lives with: spouse      Diagnostic Tests  X-ray: abnormal    FCE comments: Spondylitic changes at L5-F7Fusovpu Goals  Patient goals for therapy: return to work, increased motion, decreased pain, independence with ADLs/IADLs and return to sport/leisure activities          Objective     Static Posture     Lumbar Spine   Decreased lordosis  Pelvis   Pelvis (Right): Elevated  Palpation     Right   Tenderness of the quadratus lumborum  Tenderness     Lumbar Spine  Tenderness in the facet joint  Additional Tenderness Details  L5-S1 R facet joint tenderness    Active Range of Motion     Lumbar   Flexion: 90 degrees    Extension: 15 degrees   Left lateral flexion: 22 degrees  /    20 degrees      Right lateral flexion: 15 degrees  with pain  / 22 degrees    Additional Active Range of Motion Details  Patient notes tingling  In foot with standing extension    Strength/Myotome Testing     Right Ankle/Foot   Great toe flexion: 4+    Tests     Lumbar     Right   Negative slump test      Left Hip   Positive CLAUDIA  Right Hip   Positive CLAUDIA  Additional Tests Details  LBP with Ochoa Galena    Ambulation     Observational Gait     Additional Observational Gait Details  Patient with antalgia due to previous L femur ORIF      Patient appears to have a + trendelenberg      Functional Assessment        Single Leg Stance - Eyes Open   Left  Trial 1: 5 seconds    /  10 sec    Right  Trial 1: 6 seconds   /  10 sec    General Comments:    Lower quarter screen   Hips: unremarkable  Knees: unremarkable  Foot/ankle: unremarkable    Ankle/Foot Comments   Great toe on R:  4+/5

## 2022-01-26 NOTE — PROGRESS NOTES
Daily Note     Today's date: 2022  Patient name: Lubna Friend  : 1955  MRN: [id number]  Referring provider: No ref  provider found  Dx:   Encounter Diagnosis     ICD-10-CM    1  Chronic low back pain, unspecified back pain laterality, unspecified whether sciatica present  M54 50     G89 29                   Subjective: See REV      Objective: See treatment diary below      Assessment: Tolerated treatment well  Patient would benefit from continued PT      Plan: Continue per plan of care  Precautions: Access Code: [email or web address]  URL: [email or web address]  Date: 2021  Prepared by: Amada Plum    Exercises  Standing Lumbar Extension - 2 x daily - 7 x weekly - 1 sets - 5 reps - 5 hold  Prone Press Up on Elbows - 2 x daily - 7 x weekly - 1 sets - 10 reps - 10 hold        Manuals            JM R L5-S1 facet joint  MW           (-) IASTM  L and R 9' 6'           REV  10                        Neuro Re-Ed             Paloff press 15 ea RTB RTB x 20           SLS 10 x :10 10/10:            Side Stepping GTB 5 laps 5 laps            CLamshells GTB x 20 2x10 GTB           PT  20x:05           Piriformis stretch on R             Bridge 2x10 10 x GTB :05           PT with SLR  2 x 10           Bike 6' 6  min           Hamstring st  5 x :20           Standing lumbar Extension - belt with OP 10 x :05 X 20                        Prone Press ups 10 x :10 20x:10           Open Book 5 x :20 5 x :20                                      Ther Activity                                       Gait Training                                       Modalities

## 2022-01-31 ENCOUNTER — OFFICE VISIT (OUTPATIENT)
Dept: PHYSICAL THERAPY | Facility: CLINIC | Age: 67
End: 2022-01-31
Payer: COMMERCIAL

## 2022-01-31 DIAGNOSIS — G89.29 CHRONIC LOW BACK PAIN, UNSPECIFIED BACK PAIN LATERALITY, UNSPECIFIED WHETHER SCIATICA PRESENT: Primary | ICD-10-CM

## 2022-01-31 DIAGNOSIS — M54.50 CHRONIC LOW BACK PAIN, UNSPECIFIED BACK PAIN LATERALITY, UNSPECIFIED WHETHER SCIATICA PRESENT: Primary | ICD-10-CM

## 2022-01-31 PROCEDURE — 97112 NEUROMUSCULAR REEDUCATION: CPT

## 2022-01-31 PROCEDURE — 97110 THERAPEUTIC EXERCISES: CPT

## 2022-01-31 NOTE — PROGRESS NOTES
Daily Note     Today's date: 2022  Patient name: Fernando Jennings  : 1955  MRN: [id number]  Referring provider: No ref  provider found  Dx:   Encounter Diagnosis     ICD-10-CM    1  Chronic low back pain, unspecified back pain laterality, unspecified whether sciatica present  M54 50     G89 29                   Subjective: No change in status since last visit  Patient did not walk recreationally at home this weekend due to weather  Objective: See treatment diary below      Assessment: Tolerated treatment well  Patient exhibited good technique with therapeutic exercises      Plan: Continue per plan of care  Precautions: Access Code: [email or web address]  URL: [email or web address]  Date: 2021  Prepared by: Almas Arceo    Exercises  Standing Lumbar Extension - 2 x daily - 7 x weekly - 1 sets - 5 reps - 5 hold  Prone Press Up on Elbows - 2 x daily - 7 x weekly - 1 sets - 10 reps - 10 hold        Manuals           CONRAD GARCIA L5-S1 facet joint  MW           (-) IASTM  L and R 9' 6' 6          REV  10                        Neuro Re-Ed             Paloff press 15 ea RTB RTB x 20 RTB x 20           SLS 10 x :10 10/10:  10"/10          Side Stepping GTB 5 laps 5 laps  GTB 5 laps           CLamshells GTB x 20 2x10 GTB GTB x 20          PT  20x:05 5" x 20           Piriformis stretch on R             Bridge 2x10 10 x GTB :05 GTB 5" x 20          PT with SLR  2 x 10 1 5# 2 x 10          Bike 6' 6  min 7 min           Hamstring st  5 x :20 Strap 5 x 20"          Standing lumbar Extension - belt with OP 10 x :05 X 20 X 20           Standing hip abduction and extension   GTB x 20           Prone Press ups 10 x :10 20x:10 10"  10          Open Book 5 x :20 5 x :20 5 x 20"                                     Ther Activity                                       Gait Training                                       Modalities

## 2022-02-02 ENCOUNTER — OFFICE VISIT (OUTPATIENT)
Dept: PHYSICAL THERAPY | Facility: CLINIC | Age: 67
End: 2022-02-02
Payer: COMMERCIAL

## 2022-02-02 DIAGNOSIS — M54.50 CHRONIC LOW BACK PAIN, UNSPECIFIED BACK PAIN LATERALITY, UNSPECIFIED WHETHER SCIATICA PRESENT: Primary | ICD-10-CM

## 2022-02-02 DIAGNOSIS — G89.29 CHRONIC LOW BACK PAIN, UNSPECIFIED BACK PAIN LATERALITY, UNSPECIFIED WHETHER SCIATICA PRESENT: Primary | ICD-10-CM

## 2022-02-02 PROCEDURE — 97112 NEUROMUSCULAR REEDUCATION: CPT

## 2022-02-02 PROCEDURE — 97110 THERAPEUTIC EXERCISES: CPT

## 2022-02-02 NOTE — PROGRESS NOTES
Daily Note     Today's date: 2022  Patient name: Jayna Chavez  : 1955  MRN: [id number]  Referring provider: No ref  provider found  Dx: No diagnosis found  Subjective: Patient continues to notice some discomfort in LB after sitting for long periods of time  Objective: See treatment diary below      Assessment: Tolerated treatment well  Patient exhibited good technique with therapeutic exercises      Plan: Continue per plan of care  Precautions: Access Code: [email or web address]  URL: [email or web address]  Date: 2021  Prepared by: Jocelyn Burris    Exercises  Standing Lumbar Extension - 2 x daily - 7 x weekly - 1 sets - 5 reps - 5 hold  Prone Press Up on Elbows - 2 x daily - 7 x weekly - 1 sets - 10 reps - 10 hold        Manuals          JM R L5-S1 facet joint  MW           (-) IASTM  L and R 9' 6' 6 3         REV  10                        Neuro Re-Ed             Paloff press 15 ea RTB RTB x 20 RTB x 20  GTB x 20          SLS 10 x :10 10/10:  10"/10 10" x 10          Side Stepping GTB 5 laps 5 laps  GTB 5 laps  GTB x 5 laps          CLamshells GTB x 20 2x10 GTB GTB x 20 GTB x 20          PT  20x:05 5" x 20           Piriformis stretch on R             Bridge 2x10 10 x GTB :05 GTB 5" x 20 GTB 5' x 20          PT with SLR  2 x 10 1 5# 2 x 10 1 5# x 20         Bike 6' 6  min 7 min  7         Hamstring st  5 x :20 Strap 5 x 20" Strap' 5 x 20"          Standing lumbar Extension - belt with OP 10 x :05 X 20 X 20  X 20          Standing hip abduction and extension   GTB x 20  GTB x 20         Prone Press ups 10 x :10 20x:10 10"  10 10"x 10          Open Book 5 x :20 5 x :20 5 x 20" 5 x 20"         Total gym squats   l lvl 22 3 x 10                        Ther Activity                                       Gait Training                                       Modalities

## 2022-02-07 ENCOUNTER — OFFICE VISIT (OUTPATIENT)
Dept: PHYSICAL THERAPY | Facility: CLINIC | Age: 67
End: 2022-02-07
Payer: COMMERCIAL

## 2022-02-07 DIAGNOSIS — M54.50 CHRONIC LOW BACK PAIN, UNSPECIFIED BACK PAIN LATERALITY, UNSPECIFIED WHETHER SCIATICA PRESENT: Primary | ICD-10-CM

## 2022-02-07 DIAGNOSIS — G89.29 CHRONIC LOW BACK PAIN, UNSPECIFIED BACK PAIN LATERALITY, UNSPECIFIED WHETHER SCIATICA PRESENT: Primary | ICD-10-CM

## 2022-02-07 PROCEDURE — 97110 THERAPEUTIC EXERCISES: CPT

## 2022-02-07 NOTE — PROGRESS NOTES
Daily Note     Today's date: 2022  Patient name: Chichi Puentes  : 1955  MRN: [id number]  Referring provider: No ref  provider found  Dx: No diagnosis found  Subjective: Continues to notice stiffness in LB after sitting for any length of time or walking for long periods of time (over 3 miles)      Objective: See treatment diary below      Assessment: Tolerated treatment well  Patient exhibited good technique with therapeutic exercises      Plan: Continue per plan of care  Precautions: Access Code: [email or web address]  URL: [email or web address]  Date: 2021  Prepared by: Rasta Huynh    Exercises  Standing Lumbar Extension - 2 x daily - 7 x weekly - 1 sets - 5 reps - 5 hold  Prone Press Up on Elbows - 2 x daily - 7 x weekly - 1 sets - 10 reps - 10 hold        Manuals         JM R L5-S1 facet joint  MW           (-) IASTM  L and R 9' 6' 6 3 3        REV  10                        Neuro Re-Ed             Paloff press 15 ea RTB RTB x 20 RTB x 20  GTB x 20  GTb 5" x 20        SLS 10 x :10 10/10:  10"/10 10" x 10  10" x 5 foam         Side Stepping GTB 5 laps 5 laps  GTB 5 laps  GTB x 5 laps  GTB 5 laps         CLamshells GTB x 20 2x10 GTB GTB x 20 GTB x 20  GTB 20         PT  20x:05 5" x 20           Piriformis stretch on R             Bridge 2x10 10 x GTB :05 GTB 5" x 20 GTB 5' x 20  GTB x 20         PT with SLR  2 x 10 1 5# 2 x 10 1 5# x 20 2# 2 x 10        Bike 6' 6  min 7 min  7 7        Hamstring st  5 x :20 Strap 5 x 20" Strap' 5 x 20"  Strap 5 x 20"        Standing lumbar Extension - belt with OP 10 x :05 X 20 X 20  X 20          Standing hip abduction and extension   GTB x 20  GTB x 20 GTB x 20         Prone Press ups 10 x 20  :10 20x:10 10"  10 10"x 10  10" x 10         Open Book 5 x :20 5 x :20 5 x 20" 5 x 20" 5 x 20"        Total gym squats   l lvl 22 3 x 10  lvl 22 x 30                       Ther Activity                                       Gait Training                                       Modalities

## 2022-02-09 ENCOUNTER — OFFICE VISIT (OUTPATIENT)
Dept: PHYSICAL THERAPY | Facility: CLINIC | Age: 67
End: 2022-02-09
Payer: COMMERCIAL

## 2022-02-09 DIAGNOSIS — G89.29 CHRONIC LOW BACK PAIN, UNSPECIFIED BACK PAIN LATERALITY, UNSPECIFIED WHETHER SCIATICA PRESENT: Primary | ICD-10-CM

## 2022-02-09 DIAGNOSIS — M54.50 CHRONIC LOW BACK PAIN, UNSPECIFIED BACK PAIN LATERALITY, UNSPECIFIED WHETHER SCIATICA PRESENT: Primary | ICD-10-CM

## 2022-02-09 PROCEDURE — 97530 THERAPEUTIC ACTIVITIES: CPT | Performed by: PHYSICAL THERAPIST

## 2022-02-09 PROCEDURE — 97112 NEUROMUSCULAR REEDUCATION: CPT | Performed by: PHYSICAL THERAPIST

## 2022-02-09 PROCEDURE — 97110 THERAPEUTIC EXERCISES: CPT | Performed by: PHYSICAL THERAPIST

## 2022-02-09 NOTE — PROGRESS NOTES
Daily Note     Today's date: 2022  Patient name: Lindsey Freeman  : 1955  MRN: [id number]  Referring provider: No ref  provider found  Dx:   Encounter Diagnosis     ICD-10-CM    1  Chronic low back pain, unspecified back pain laterality, unspecified whether sciatica present  M54 50     G89 29                   Subjective: Patient reports that he has no pain in the back  He notes fatigue in his LB after walking several miles  Objective: See treatment diary below      Assessment: Tolerated treatment well  Patient would benefit from continued PT      Plan: Continue per plan of care  Precautions: Access Code: [email or web address]  URL: [email or web address]  Date: 2021  Prepared by: Negrita Espitia    Exercises  Standing Lumbar Extension - 2 x daily - 7 x weekly - 1 sets - 5 reps - 5 hold  Prone Press Up on Elbows - 2 x daily - 7 x weekly - 1 sets - 10 reps - 10 hold        Manuals        JM R L5-S1 facet joint  MW           (-) IASTM  L and R 9' 6' 6 3 3 3'       REV  10                        Neuro Re-Ed             Paloff press 15 ea RTB RTB x 20 RTB x 20  GTB x 20  GTb 5" x 20 GTB x 20       SLS 10 x :10 10/10:  10"/10 10" x 10  10" x 5 foam  5 x :10       Side Stepping GTB 5 laps 5 laps  GTB 5 laps  GTB x 5 laps  GTB 5 laps  GTB 5 laps       CLamshells GTB x 20 2x10 GTB GTB x 20 GTB x 20  GTB 20  GTB x 20       PT  20x:05 5" x 20           Piriformis stretch on R             Bridge 2x10 10 x GTB :05 GTB 5" x 20 GTB 5' x 20  GTB x 20  GTB x 20       PT with SLR  2 x 10 1 5# 2 x 10 1 5# x 20 2# 2 x 10 3# x 20       Bike 6' 6  min 7 min  7 7 7'       Hamstring st  5 x :20 Strap 5 x 20" Strap' 5 x 20"  Strap 5 x 20" 5 x :20       Standing lumbar Extension - belt with OP 10 x :05 X 20 X 20  X 20   x20       Standing hip abduction and extension   GTB x 20  GTB x 20 GTB x 20  GTB x 20       Prone Press ups 10 x 20  :10 20x:10 10"  10 10"x 10  10" x 10  10 x :10 Open Book 5 x :20 5 x :20 5 x 20" 5 x 20" 5 x 20" 5 x :20       Total gym squats   l lvl 22 3 x 10  lvl 22 x 30  lvl 22 x 30        Hip Hinge      x5       Ther Activity                                       Gait Training                                       Modalities

## 2022-02-14 ENCOUNTER — OFFICE VISIT (OUTPATIENT)
Dept: PHYSICAL THERAPY | Facility: CLINIC | Age: 67
End: 2022-02-14
Payer: COMMERCIAL

## 2022-02-14 DIAGNOSIS — M54.50 CHRONIC LOW BACK PAIN, UNSPECIFIED BACK PAIN LATERALITY, UNSPECIFIED WHETHER SCIATICA PRESENT: Primary | ICD-10-CM

## 2022-02-14 DIAGNOSIS — G89.29 CHRONIC LOW BACK PAIN, UNSPECIFIED BACK PAIN LATERALITY, UNSPECIFIED WHETHER SCIATICA PRESENT: Primary | ICD-10-CM

## 2022-02-14 PROCEDURE — 97110 THERAPEUTIC EXERCISES: CPT

## 2022-02-14 PROCEDURE — 97112 NEUROMUSCULAR REEDUCATION: CPT

## 2022-02-14 NOTE — PROGRESS NOTES
Daily Note     Today's date: 2022  Patient name: Jayna Chavez  : 1955  MRN: [id number]  Referring provider: Luiz Weathers MD  Dx:   Encounter Diagnosis     ICD-10-CM    1  Chronic low back pain, unspecified back pain laterality, unspecified whether sciatica present  M54 50     G89 29                   Subjective: No significant complaints to begin treatment      Objective: See treatment diary below      Assessment: Tolerated treatment well  Patient exhibited good technique with therapeutic exercises      Plan: Continue per plan of care  Precautions: Access Code: [email or web address]  URL: [email or web address]  Date: 2021  Prepared by: Jocelyn Burris    Exercises  Standing Lumbar Extension - 2 x daily - 7 x weekly - 1 sets - 5 reps - 5 hold  Prone Press Up on Elbows - 2 x daily - 7 x weekly - 1 sets - 10 reps - 10 hold        Manuals        JM R L5-S1 facet joint  MW           (-) IASTM  L and R 9' 6' 6 3 3 3'       REV  10                        Neuro Re-Ed             [place] press 15 ea RTB RTB x 20 RTB x 20  GTB x 20  GTb 5" x 20 GTB x 20 GTB x 20      SLS 10 x :10 10/10:  10"/10 10" x 10  10" x 5 foam  5 x :10 10" x 10       Side Stepping GTB 5 laps 5 laps  GTB 5 laps  GTB x 5 laps  GTB 5 laps  GTB 5 laps GTB x 5 laps       CLamshells GTB x 20 2x10 GTB GTB x 20 GTB x 20  GTB 20  GTB x 20 GTB x 30       PT  20x:05 5" x 20     5"x 20      Piriformis stretch on R             Bridge 2x10 10 x GTB :05 GTB 5" x 20 GTB 5' x 20  GTB x 20  GTB x 20 GTB x 20       PT with SLR  2 x 10 1 5# 2 x 10 1 5# x 20 2# 2 x 10 3# x 20 3# 2x10         Bike 6' 6  min 7 min  7 7 7' 7      Hamstring st  5 x :20 Strap 5 x 20" Strap' 5 x 20"  Strap 5 x 20" 5 x :20       Standing lumbar Extension - belt with OP 10 x :05 X 20 X 20  X 20   x20 X 20       Standing hip abduction and extension   GTB x 20  GTB x 20 GTB x 20  GTB x 20 GTB x 20       Prone Press ups 10 x 20  :10 20x:10 10"  10 10"x 10  10" x 10  10 x :10 10"x 10       Open Book 5 x :20 5 x :20 5 x 20" 5 x 20" 5 x 20" 5 x :20 5 x 20"      Total gym squats   l lvl 22 3 x 10  lvl 22 x 30  lvl 22 x 30  lvl 22 x 30       Hip Hinge      x5       Leg press        95# x 20      Ther Activity                                       Gait Training                                       Modalities

## 2022-02-16 ENCOUNTER — OFFICE VISIT (OUTPATIENT)
Dept: PHYSICAL THERAPY | Facility: CLINIC | Age: 67
End: 2022-02-16
Payer: COMMERCIAL

## 2022-02-16 DIAGNOSIS — G89.29 CHRONIC LOW BACK PAIN, UNSPECIFIED BACK PAIN LATERALITY, UNSPECIFIED WHETHER SCIATICA PRESENT: Primary | ICD-10-CM

## 2022-02-16 DIAGNOSIS — M54.50 CHRONIC LOW BACK PAIN, UNSPECIFIED BACK PAIN LATERALITY, UNSPECIFIED WHETHER SCIATICA PRESENT: Primary | ICD-10-CM

## 2022-02-16 PROCEDURE — 97112 NEUROMUSCULAR REEDUCATION: CPT | Performed by: PHYSICAL THERAPIST

## 2022-02-16 PROCEDURE — 97530 THERAPEUTIC ACTIVITIES: CPT | Performed by: PHYSICAL THERAPIST

## 2022-02-16 PROCEDURE — 97110 THERAPEUTIC EXERCISES: CPT | Performed by: PHYSICAL THERAPIST

## 2022-02-16 NOTE — PROGRESS NOTES
Daily Note and Discharge     Today's date: 2022  Patient name: Ray Barakat  : 1955  MRN: [id number]  Referring provider: No ref  provider found  Dx:   Encounter Diagnosis     ICD-10-CM    1  Chronic low back pain, unspecified back pain laterality, unspecified whether sciatica present  M54 50     G89 29                   Subjective: Patient reports that his back feels good  He is leaving for [facility] in one week and is therefore discontinuing treatment at this time  Objective: See treatment diary below      Assessment: Tolerated treatment well  Patient would benefit from continued PT      Plan: Patient is DC at this visit  Precautions: Access Code: [email or web address]  URL: [email or web address]  Date: 2021  Prepared by: Denver Doe    Exercises  Standing Lumbar Extension - 2 x daily - 7 x weekly - 1 sets - 5 reps - 5 hold  Prone Press Up on Elbows - 2 x daily - 7 x weekly - 1 sets - 10 reps - 10 hold        Manuals      JM R L5-S1 facet joint  MW           (-) IASTM  L and R 9' 6' 6 3 3 3'  3'     REV  10                        Neuro Re-Ed             [place] press 15 ea RTB RTB x 20 RTB x 20  GTB x 20  GTb 5" x 20 GTB x 20 GTB x 20 GTB x 20     SLS 10 x :10 10/10:  10"/10 10" x 10  10" x 5 foam  5 x :10 10" x 10  10 x :10     Side Stepping GTB 5 laps 5 laps  GTB 5 laps  GTB x 5 laps  GTB 5 laps  GTB 5 laps GTB x 5 laps  GTB x 5 laps     CLamshells GTB x 20 2x10 GTB GTB x 20 GTB x 20  GTB 20  GTB x 20 GTB x 30  GTB x 30     PT  20x:05 5" x 20     5"x 20 5 x :20     Piriformis stretch on R             Bridge 2x10 10 x GTB :05 GTB 5" x 20 GTB 5' x 20  GTB x 20  GTB x 20 GTB x 20  GTB x 20     PT with SLR  2 x 10 1 5# 2 x 10 1 5# x 20 2# 2 x 10 3# x 20 3# 2x10    3# 2 x 10     Bike 6' 6  min 7 min  7 7 7' 7 7'     Hamstring st  5 x :20 Strap 5 x 20" Strap' 5 x 20"  Strap 5 x 20" 5 x :20 5 x :20 5 x :20     Standing lumbar Extension - belt with OP 10 x :05 X 20 X 20  X 20   x20 X 20  x20     Standing hip abduction and extension   GTB x 20  GTB x 20 GTB x 20  GTB x 20 GTB x 20  GTB x 20     Prone Press ups 10 x 20  :10 20x:10 10"  10 10"x 10  10" x 10  10 x :10 10"x 10  10 x :10     Open Book 5 x :20 5 x :20 5 x 20" 5 x 20" 5 x 20" 5 x :20 5 x 20" dc     Total gym squats   l lvl 22 3 x 10  lvl 22 x 30  lvl 22 x 30  lvl 22 x 30  lvl 22 x 30     Hip Hinge      x5       Leg press        95# x 20 95# x 20     Ther Activity                                       Gait Training                                       Modalities

## 2022-10-12 PROBLEM — Z12.11 SCREENING FOR MALIGNANT NEOPLASM OF COLON: Status: RESOLVED | Noted: 2021-08-18 | Resolved: 2022-10-12

## 2024-01-15 ENCOUNTER — TELEPHONE (OUTPATIENT)
Dept: UROLOGY | Facility: AMBULATORY SURGERY CENTER | Age: 69
End: 2024-01-15

## 2024-01-15 NOTE — TELEPHONE ENCOUNTER
Called pt and lvm saying appt 1/31 with Dr Huerta needs to be cancelled for surgery day. There are availabilities in [place] tomorrow so please call back today and we can find a new appt for pt.     Any md or pa per Anamaria's triage email.

## 2024-01-16 ENCOUNTER — OFFICE VISIT (OUTPATIENT)
Dept: UROLOGY | Facility: AMBULATORY SURGERY CENTER | Age: 69
End: 2024-01-16
Payer: COMMERCIAL

## 2024-01-16 VITALS
WEIGHT: 193 LBS | BODY MASS INDEX: 27.63 KG/M2 | HEART RATE: 59 BPM | OXYGEN SATURATION: 98 % | DIASTOLIC BLOOD PRESSURE: 72 MMHG | SYSTOLIC BLOOD PRESSURE: 140 MMHG | HEIGHT: 70 IN

## 2024-01-16 DIAGNOSIS — R97.20 ELEVATED PSA: Primary | ICD-10-CM

## 2024-01-16 PROCEDURE — 99204 OFFICE O/P NEW MOD 45 MIN: CPT | Performed by: UROLOGY

## 2024-01-16 NOTE — PROGRESS NOTES
Office Visit- Urology  Mario Bowser 1955 MRN: [id number]      Assessment/Discussion/Plan    68 y.o. male managed by     ***  -  -    2. ***  -  -    3.***  -  -      Chief Complaint:   Mario is a 68 y.o. male presenting to the office for {JMINITIALEVAL:19485} ***        Subjective        IPSS SCORE:   Incomplete Emptying  {IPSS rating 1-5:80754}  Frequency {IPSS rating 1-5:62941}  Intermittency {IPSS rating 1-5:95840}  Urgency {IPSS rating 1-5:98120}  Weak Stream {IPSS rating 1-5:40797}  Straining to Stream {IPSS rating 1-5:49318}  Nocturia {IPSS rating nocturia:27238}  QOL {IPSS QOL:82019}  Total Score: ** indicating {IPSS SCORE:76289}        ROS:   Review of Systems      Past Medical History  No past medical history on file.    Past Surgical History  No past surgical history on file.    Past Family History  Family History   Problem Relation Age of Onset    Rectal cancer Paternal Grandmother        Past Social history  Social History     Socioeconomic History    Marital status: /Civil Union     Spouse name: Not on file    Number of children: Not on file    Years of education: Not on file    Highest education level: Not on file   Occupational History    Not on file   Tobacco Use    Smoking status: Never    Smokeless tobacco: Never   Substance and Sexual Activity    Alcohol use: Not on file    Drug use: Not on file    Sexual activity: Not on file   Other Topics Concern    Not on file   Social History Narrative    Not on file     Social Determinants of Health     Financial Resource Strain: Not on file   Food Insecurity: Not on file   Transportation Needs: Not on file   Physical Activity: Not on file   Stress: Not on file   Social Connections: Not on file   Intimate Partner Violence: Not on file   Housing Stability: Not on file       Current Medications  Current Outpatient Medications   Medication Sig Dispense Refill    aspirin (ECOTRIN) 325 mg EC tablet Take 325 mg by mouth       No current  "facility-administered medications for this visit.       Allergies  Allergies   Allergen Reactions    Other Other (See Comments)     Seasonal allergy    Dm-Doxylamine-Acetaminophen Other (See Comments)     JITTERY    Levofloxacin Other (See Comments)     TENDONITIS - NEVER TOOK IT BUT HAS HAD TENDINITIS AND DOESN'T WANT TO TAKE IT       OBJECTIVE    Vitals   There were no vitals filed for this visit.    PVR:    Physical Exam     Labs:   {*** HELP TEXT ***    This SmartLink requires parameters for processing. Parameters allow for more information to be given to the SmartLink. This allows you to request specific information by giving the SmartLink precise direction.    The SmartLink accepts a list of result component base names  by commas. You can also request the number of results to display for each component. To indicate the number of results for each component, type the component name followed by a colon and then the number of results (Name:4). For all results for that particular component, type a star in place of the number (Name:*). To obtain the first and last results for a particular component, type FL in place of the number or the star (Name:FL). To obtain the last result for a particular component, type the component name without a colon, number, or star.    Example: .RESUFAST[MCH:3,MCV:*,HCT  }LASTLAB(PROTEIN UA,TP,[id number],PROT,PROTEIN UA,PROTEINUA,PROTUR,LABPROTURI,PROTEIN,URPROTEIN)@   No results found for: \"PSA\"  No results found for: \"CREATININE\"   No results found for: \"HGBA1C\"  No results found for: \"GLUCOSE\", \"CALCIUM\", \"NA\", \"K\", \"CO2\", \"CL\", \"BUN\", \"CREATININE\"    I have personally reviewed all pertinent lab results and reviewed with patient    Imaging       Otoniel Lane PA-C  Date: 1/16/2024 Time: 1:58 PM  [facility]    This note was written using [employer] dictation software. Please excuse any resulting minor grammatical errors.      "

## 2024-01-16 NOTE — PROGRESS NOTES
Office Visit- Urology  Mario Bowser 1955 MRN: [id number]      Assessment/Discussion/Plan    68 y.o. male managed by     ***  -  -    2. ***  -  -    3.***  -  -      Chief Complaint:   Mario is a 68 y.o. male presenting to the office for {JMINITIALEVAL:06399} ***        Subjective        IPSS SCORE:   Incomplete Emptying  {IPSS rating 1-5:23301}  Frequency {IPSS rating 1-5:77875}  Intermittency {IPSS rating 1-5:19639}  Urgency {IPSS rating 1-5:07970}  Weak Stream {IPSS rating 1-5:12327}  Straining to Stream {IPSS rating 1-5:01484}  Nocturia {IPSS rating nocturia:63674}  QOL {IPSS QOL:96630}  Total Score: ** indicating {IPSS SCORE:77485}        ROS:   Review of Systems      Past Medical History  History reviewed. No pertinent past medical history.    Past Surgical History  History reviewed. No pertinent surgical history.    Past Family History  Family History   Problem Relation Age of Onset    Rectal cancer Paternal Grandmother        Past Social history  Social History     Socioeconomic History    Marital status: /Civil Union     Spouse name: Not on file    Number of children: Not on file    Years of education: Not on file    Highest education level: Not on file   Occupational History    Not on file   Tobacco Use    Smoking status: Never    Smokeless tobacco: Never   Vaping Use    Vaping status: Never Used   Substance and Sexual Activity    Alcohol use: Not Currently    Drug use: Never    Sexual activity: Yes     Partners: Female   Other Topics Concern    Not on file   Social History Narrative    Not on file     Social Determinants of Health     Financial Resource Strain: Not on file   Food Insecurity: Not on file   Transportation Needs: Not on file   Physical Activity: Not on file   Stress: Not on file   Social Connections: Not on file   Intimate Partner Violence: Not on file   Housing Stability: Not on file       Current Medications  Current Outpatient Medications   Medication Sig Dispense Refill     "aspirin (ECOTRIN) 325 mg EC tablet Take 325 mg by mouth       No current facility-administered medications for this visit.       Allergies  Allergies   Allergen Reactions    Other Other (See Comments)     Seasonal allergy    Dm-Doxylamine-Acetaminophen Other (See Comments)     JITTERY    Levofloxacin Other (See Comments)     TENDONITIS - NEVER TOOK IT BUT HAS HAD TENDINITIS AND DOESN'T WANT TO TAKE IT       OBJECTIVE    Vitals   Vitals:    01/16/24 1416   BP: 140/72   BP Location: Left arm   Patient Position: Sitting   Cuff Size: Adult   Pulse: 59   SpO2: 98%   Weight: 87.5 kg (193 lb)   Height: 5' 10\" (1.778 m)       PVR:    Physical Exam     Labs:   {*** HELP TEXT ***    This SmartLink requires parameters for processing. Parameters allow for more information to be given to the SmartLink. This allows you to request specific information by giving the SmartLink precise direction.    The SmartLink accepts a list of result component base names  by commas. You can also request the number of results to display for each component. To indicate the number of results for each component, type the component name followed by a colon and then the number of results (Name:4). For all results for that particular component, type a star in place of the number (Name:*). To obtain the first and last results for a particular component, type FL in place of the number or the star (Name:FL). To obtain the last result for a particular component, type the component name without a colon, number, or star.    Example: .RESUFAST[MCH:3,MCV:*,HCT  }LASTLAB(PROTEIN UA,TP,[id number],PROT,PROTEIN UA,PROTEINUA,PROTUR,LABPROTURI,PROTEIN,URPROTEIN)@   No results found for: \"PSA\"  No results found for: \"CREATININE\"   No results found for: \"HGBA1C\"  No results found for: \"GLUCOSE\", \"CALCIUM\", \"NA\", \"K\", \"CO2\", \"CL\", \"BUN\", \"CREATININE\"    I have personally reviewed all pertinent lab results and reviewed with patient    Imaging       Otoniel Elkins " ANGELIKA Lane  Date: 1/16/2024 Time: 2:30 PM  [facility]    This note was written using [employer] dictation software. Please excuse any resulting minor grammatical errors.

## 2024-01-16 NOTE — PROGRESS NOTES
"1/16/2024    Mario Bowser  1955  [id number]        Assessment  Elevated PSA    Plan  We discussed the implications of elevated PSA.  We discussed that this may represent a fluctuation or inaccurate result.  Alternatively, this may be elevated in light of his enlarged prostate.    Finally, we discussed that this may be result of prostate cancer which is of course of concern.  Recommendations repeat PSA in the next 2 weeks.  If remains concerning, we will then proceed with MRI of the prostate.  We discussed indications for this.  He does have a chu in his femur and would like to check that this is MRI safe.  Typically they are however this is reasonable.  If PSA reverts to baseline, may continue observation.  If he cannot have an MRI but there is elevated PSA, we can proceed with prostate biopsy.    We will discuss recommendations after next PSA check.  All questions answered and they are comfortable with the plan.          History of Present Illness  Mario is a 68 y.o. male referred for evaluation of elevated PSA.  He denies history of prostate trouble in the past.  However, on further questioning, he does have nocturia twice per night.  He does feel incomplete emptying at times when he has to return to the bathroom 30 minutes after voiding.  As has not been terribly bothersome for him.  He has not required medication.  No prior history of elevated PSA.  PSA in 2022 was 3.17.    Denies family history of prostate cancer, though there is family history of cancers including colon cancer and \"bone cancer.\"    PSA from outside lab 5.5 on November 2022.    He has erectile dysfunction and indicated that is not a result of sexual activity.      Review of Systems  Review of Systems   Constitutional: Negative.    HENT: Negative.     Respiratory: Negative.     Cardiovascular: Negative.    Gastrointestinal: Negative.    Genitourinary:         As per HPI   Musculoskeletal: Negative.    Skin: Negative.    Neurological: " Negative.    Hematological: Negative.          Past Medical History  History reviewed. No pertinent past medical history.    Past Social History  History reviewed. No pertinent surgical history.    Past Family History  Family History   Problem Relation Age of Onset    Rectal cancer Paternal Grandmother        Past Social history  Social History     Socioeconomic History    Marital status: /Civil Union     Spouse name: Not on file    Number of children: Not on file    Years of education: Not on file    Highest education level: Not on file   Occupational History    Not on file   Tobacco Use    Smoking status: Never    Smokeless tobacco: Never   Vaping Use    Vaping status: Never Used   Substance and Sexual Activity    Alcohol use: Not Currently    Drug use: Never    Sexual activity: Yes     Partners: Female   Other Topics Concern    Not on file   Social History Narrative    Not on file     Social Determinants of Health     Financial Resource Strain: Not on file   Food Insecurity: Not on file   Transportation Needs: Not on file   Physical Activity: Not on file   Stress: Not on file   Social Connections: Not on file   Intimate Partner Violence: Not on file   Housing Stability: Not on file     Social History     Tobacco Use   Smoking Status Never   Smokeless Tobacco Never       Current Medications  Current Outpatient Medications   Medication Sig Dispense Refill    aspirin (ECOTRIN) 325 mg EC tablet Take 325 mg by mouth       No current facility-administered medications for this visit.       Allergies  Allergies   Allergen Reactions    Other Other (See Comments)     Seasonal allergy    Dm-Doxylamine-Acetaminophen Other (See Comments)     JITTERY    Levofloxacin Other (See Comments)     TENDONITIS - NEVER TOOK IT BUT HAS HAD TENDINITIS AND DOESN'T WANT TO TAKE IT       Past Medical History, Social History, Family History, medications and allergies were reviewed.    Vitals  Vitals:    01/16/24 1416   BP: 140/72   BP  "Location: Left arm   Patient Position: Sitting   Cuff Size: Adult   Pulse: 59   SpO2: 98%   Weight: 87.5 kg (193 lb)   Height: 5' 10\" (1.778 m)       Physical Exam  Physical Exam  Vitals reviewed.   Constitutional:       Appearance: He is well-developed.   HENT:      Head: Normocephalic and atraumatic.   Eyes:      Conjunctiva/sclera: Conjunctivae normal.   Cardiovascular:      Rate and Rhythm: Normal rate.   Pulmonary:      Effort: Pulmonary effort is normal.   Abdominal:      Palpations: Abdomen is soft.   Genitourinary:     Comments: Prostate at least 60 gm, smooth, no palpable nodules.  Musculoskeletal:         General: Normal range of motion.   Skin:     General: Skin is warm and dry.   Neurological:      Mental Status: He is alert and oriented to person, place, and time.   Psychiatric:         Mood and Affect: Mood normal.           Results  No results found for: \"PSA\"  No results found for: \"GLUCOSE\", \"CALCIUM\", \"NA\", \"K\", \"CO2\", \"CL\", \"BUN\", \"CREATININE\"  No results found for: \"WBC\", \"HGB\", \"HCT\", \"MCV\", \"PLT\"        "

## 2024-01-17 ENCOUNTER — APPOINTMENT (OUTPATIENT)
Dept: LAB | Facility: CLINIC | Age: 69
End: 2024-01-17
Payer: COMMERCIAL

## 2024-01-17 DIAGNOSIS — R97.20 ELEVATED PSA: ICD-10-CM

## 2024-01-17 LAB — PSA SERPL-MCNC: 4 NG/ML (ref 0–4)

## 2024-01-17 PROCEDURE — 36415 COLL VENOUS BLD VENIPUNCTURE: CPT

## 2024-01-17 PROCEDURE — 84153 ASSAY OF PSA TOTAL: CPT

## 2024-01-25 NOTE — TELEPHONE ENCOUNTER
Patient's PSA is still minimally elevated at 4.0 on recheck.  This is decreased from 5.5 measured in November which is reassuring but if patient is able to obtain a multiparametric MRI of the prostate with his orthopedic hardware for the next step.  Discussed with Mario and he had discussed with surgeon who placed chu and suggested a MARS?  Please advise as I don't know what this is

## 2024-02-08 ENCOUNTER — HOSPITAL ENCOUNTER (OUTPATIENT)
Dept: RADIOLOGY | Age: 69
Discharge: HOME | End: 2024-02-08
Payer: COMMERCIAL

## 2024-02-08 DIAGNOSIS — R97.20 ELEVATED PSA: ICD-10-CM

## 2024-02-08 PROCEDURE — 76377 3D RENDER W/INTRP POSTPROCES: CPT

## 2024-02-08 PROCEDURE — 72197 MRI PELVIS W/O & W/DYE: CPT

## 2024-02-08 PROCEDURE — A9585 GADOBUTROL INJECTION: HCPCS

## 2024-02-08 PROCEDURE — G1004 CDSM NDSC: HCPCS

## 2024-02-08 RX ORDER — GADOBUTROL 604.72 MG/ML
9 INJECTION INTRAVENOUS
Status: COMPLETED | OUTPATIENT
Start: 2024-02-08 | End: 2024-02-08

## 2024-02-08 RX ADMIN — GADOBUTROL 9 ML: 604.72 INJECTION INTRAVENOUS at 15:32

## 2024-02-15 ENCOUNTER — TELEPHONE (OUTPATIENT)
Dept: UROLOGY | Facility: CLINIC | Age: 69
End: 2024-02-15

## 2024-02-15 DIAGNOSIS — R97.20 ELEVATED PSA: Primary | ICD-10-CM

## 2024-02-15 NOTE — TELEPHONE ENCOUNTER
Called and spoke to patient to let him know that his MRI cam back reassuring. It did not reveal any lesions in the prostate and overall indicates low likelihood of clinically significant prostate cancer being present. I did let patient know that he does have an enlarged prostate measuring at 68g which that could be the reason why his PSA has been mildly elevated. I explained that we recommend a follow up in 6 months with another PSA. I scheduled an appt on 08/26/24 @2:40pm with Otoniel in the [place] office. Patient is aware of the date time and confirmed his appt. Also told patient that we do not feel strongly about proceeding with a biopsy based on reassuring MRI at this point in time. Patient is aware of everything and is scheduled for an appt in August and should be obtaining his PSA 1-2 weeks prior to his appt.            ----- Message from Otoniel Lane PA-C sent at 2/15/2024  1:53 PM EST -----  Please call patient and inform him that his multiparametric MRI of the prostate is reassuring.  It does not reveal any lesions in the prostate.  It overall indicates a low likelihood of clinically significant prostate cancer being present.  He does have an enlarged prostate measuring about 68 g which can be why we are seeing a mild elevation in his PSA.  I think at this point in time patient can follow-up in the office in 6 months with a PSA prior.  I do not feel strongly about proceeding with a biopsy based on reassuring MRI at this point in time

## 2024-02-21 ENCOUNTER — OFFICE VISIT (OUTPATIENT)
Dept: DERMATOLOGY | Facility: CLINIC | Age: 69
End: 2024-02-21
Payer: COMMERCIAL

## 2024-02-21 VITALS — HEIGHT: 70 IN | TEMPERATURE: 97.8 F | WEIGHT: 193 LBS | BODY MASS INDEX: 27.63 KG/M2

## 2024-02-21 DIAGNOSIS — L57.0 ACTINIC KERATOSIS: ICD-10-CM

## 2024-02-21 DIAGNOSIS — D48.5 NEOPLASM OF UNCERTAIN BEHAVIOR OF SKIN: Primary | ICD-10-CM

## 2024-02-21 PROCEDURE — 17000 DESTRUCT PREMALG LESION: CPT | Performed by: STUDENT IN AN ORGANIZED HEALTH CARE EDUCATION/TRAINING PROGRAM

## 2024-02-21 PROCEDURE — 17003 DESTRUCT PREMALG LES 2-14: CPT | Performed by: STUDENT IN AN ORGANIZED HEALTH CARE EDUCATION/TRAINING PROGRAM

## 2024-02-21 PROCEDURE — 99204 OFFICE O/P NEW MOD 45 MIN: CPT | Performed by: STUDENT IN AN ORGANIZED HEALTH CARE EDUCATION/TRAINING PROGRAM

## 2024-02-21 RX ORDER — FLUOROURACIL 50 MG/G
CREAM TOPICAL 2 TIMES DAILY
Qty: 40 G | Refills: 3 | Status: SHIPPED | OUTPATIENT
Start: 2024-02-21

## 2024-02-21 NOTE — PROGRESS NOTES
"[facility] Dermatology Clinic Note     Patient Name: Mario Bowser  Encounter Date: 02/21/24      Have you been cared for by a [facility] Dermatologist in the last 3 years and, if so, which description applies to you?    NO.   I am considered a \"new\" patient and must complete all patient intake questions. I am MALE/not capable of bearing children.    REVIEW OF SYSTEMS:  Have you recently had or currently have any of the following? Recent fever or chills? No  Any non-healing wound? No   PAST MEDICAL HISTORY:  Have you personally ever had or currently have any of the following?  If \"YES,\" then please provide more detail. Skin cancer (such as Melanoma, Basal Cell Carcinoma, Squamous Cell Carcinoma?  YES, Melanoma 2018 lateral forehead .SCC lateral [id number] ,BCC  clavicular  left neck   Tuberculosis, HIV/AIDS, Hepatitis B or C: No  Radiation Treatment No   HISTORY OF IMMUNOSUPPRESSION:   Do you have a history of any of the following:  Systemic Immunosuppression such as Diabetes, Biologic or Immunotherapy, Chemotherapy, Organ Transplantation, Bone Marrow Transplantation?  No     Answering \"YES\" requires the addition of the dotphrase \"IMMUNOSUPPRESSED\" as the first diagnosis of the patient's visit.   FAMILY HISTORY:  Any \"first degree relatives\" (parent, brother, sister, or child) with the following?    Skin Cancer, Pancreatic or Other Cancer? No   PATIENT EXPERIENCE:    Do you want the Dermatologist to perform a COMPLETE skin exam today including a clinical examination under the \"bra and underwear\" areas?  Yes  If necessary, do we have your permission to call and leave a detailed message on your Preferred Phone number that includes your specific medical information?  Yes      Allergies   Allergen Reactions    Other Other (See Comments)     Seasonal allergy    Dm-Doxylamine-Acetaminophen Other (See Comments)     JITTERY    Levofloxacin Other (See Comments)     TENDONITIS - NEVER TOOK IT BUT HAS HAD TENDINITIS AND DOESN'T " "WANT TO TAKE IT      Current Outpatient Medications:     aspirin (ECOTRIN) 325 mg EC tablet, Take 325 mg by mouth, Disp: , Rfl:           Whom besides the patient is providing clinical information about today's encounter?   NO ADDITIONAL HISTORIAN (patient alone provided history)    Physical Exam and Assessment/Plan by Diagnosis:      SEBORRHEIC KERATOSIS; NON-INFLAMED     Physical Exam:  Anatomic Location Affected:  Trunk and extremities  Morphological Description:  Waxy, smooth to warty textured, yellow to brownish-grey to dark brown to blackish, discrete, \"stuck-on\" appearing papules.  Present for years. Denies pain, itch, bleeding.      Additional History of Present Condition:  Present constantly; no modifying factors which make it worse or better. No prior treatment.       Assessment and Plan:  Based on a thorough discussion of this condition and the management approach to it (including a comprehensive discussion of the known risks, side effects and potential benefits of treatment), the patient (family) agrees to implement the following specific plan:  Reassure benign  Use sun protection.  Apply SPF 30 or higher at least three times a day.  Wear sun protecting clothing and hats.        SOLAR LENTIGINES   OTHER SKIN CHANGES DUE TO CHRONIC EXPOSURE TO NONIONIZING RADIATION     Physical Exam:  Anatomic Location Affected:  Sun exposed areas of back, chest, arms, legs  Morphological Description:  Multiple scattered brown to tan evenly pigmented macules   Denies pain, itch, bleeding. No treatments tried. Present for months - years. Reports getting newer lesions with sun exposure.         Assessment and Plan:  Based on a thorough discussion of this condition and the management approach to it (including a comprehensive discussion of the known risks, side effects and potential benefits of treatment), the patient (family) agrees to implement the following specific plan:  Reassure benign  Use sun protection.  Apply SPF 30 " "or higher at least three times a day.  Wear sun protecting clothing and hats.         MULTIPLE MELANOCYTIC NEVI (\"Moles\")     Physical Exam:  Anatomic Location Affected: Trunk and extremities  Morphological Description:  Scattered, round to ovoid, symmetrical-appearing, even bordered, skin colored to dark brown macules/papules  Denies pain, itch, bleeding. No treatments tried. Present for years. Present constantly; no modifying factors which make it worse or better. Denies actively changing or growing moles.      Assessment and Plan:  Based on a thorough discussion of this condition and the management approach to it (including a comprehensive discussion of the known risks, side effects and potential benefits of treatment), the patient (family) agrees to implement the following specific plan:  Reassure benign  Monitor for changes  Use sun protection.  Apply SPF 30 or higher at least three times a day.  Wear sun protecting clothing and hats.      ACTINIC KERATOSIS    Physical Exam:  Anatomic Location Affected and Morphological Description: Pink gritty thin papules on the forehead, scalp, bilateral forearms, and right dorsal hand     Additional History of Present Condition:  Present for a few years; cryotherapy performed in the past at prior dermatologist.     Assessment and Plan:  Based on a thorough discussion of this condition and the management approach to it (including a comprehensive discussion of the known risks, side effects and potential benefits of treatment), the patient (family) agrees to implement the following specific plan:    Cryotherapy to two larger lesions: left lateral neck and right dorsal hand (see procedure note)   Start  Efudex twice daily for 2 weeks to the forehead and scalp. Take 2-3 week break, then apply cream twice daily for 2 weeks to bilateral forearms. It does NOT need to be washed off. It will be absorbed by your skin.   Avoid applying anything else to treated area for 1 hour after. " One hour or so after application of Efudex, use plain VASELINE or AQUAPHOR over entire treated area. This is especially important once you start to have areas react to the Efudex (red, open areas). It helps treatment be more tolerable and speeds healing!   Continue use of plain VASELINE or AQUAPHOR over entire treated area after you complete treatment until the area is healed.   Wash hands after use. Keep out of reach of children and pets.     ACTINIC KERATOSISPATIENT INFORMATION:    Actinic keratoses are very common on sites repeatedly exposed to the sun, especially the backs of the hands and the face.  They are considered precancers and have a low risk of turning into squamous cell carcinoma. It is rare for a solitary actinic keratosis to evolve into a squamous cell carcinoma (SCC), but the risk is 10-15% when more than 10 actinic keratoses are present. A tender, thickened, ulcerated or enlarging actinic keratosis is suspicious of SCC.    Actinic keratoses may be prevented by strict sun protection. If already present, keratoses may improve with a very high sun protection factor (50+) broad-spectrum sunscreen applied at least daily to affected areas, year-round.  We recommend that sun protective clothing and hats and sunglasses be worn whenever possible.  Note that you can make you own UPF 30 rate clothing using just your own washing machine with a product called sun guardEFUDEX (5-Fluorouracil, 5-U)     1. Efudex is a chemotherapy medication when taken by mouth; however, in dermatology we use it to treat skin conditions most commonly actinic (solar) keratoses. When applied topically the medication is minimally absorbed into the bloodstream and does not cause typical chemotherapy side effects. It is safe to your body.   2. Efudex works by destroying the damaged cells on your skin. As a result it causes redness, irritation, and scabbing. This is a normal part of therapeutic skin response to Efudex.   3. The treated  areas need to be completely protected from the sun during the treatment and the recovery process. If you need to go out into the sun, cover the area with a hat, long sleeve shirts, gloves, etc.   4. Treatment approaches very depending on the condition that is being treated. Your doctor will specify which approach is right for you.     What to Expect During Therapy   ·  Redness is caused by inflammation and destruction of the abnormal cells and indicates your lesions are responding to the treatment. You may use an over-the-counter 1% hydrocortisone ointment to decrease redness, although the treatment may be somewhat more effective if you are able to avoid cortisone use. It takes 2-4 weeks for the redness to fade after stopping the Efudex; most of the redness resolves over the first 1-2 weeks.   ·  Crusting and peeling occurs as the sun-damaged skin is removed to allow for replacement by normal skin. Cool washcloth soaks (washcloth soaked with cool water over face for 15-20 minutes 3-4 times per day) can help as well as a moisturizing ointment, such as Vaseline or Aquaphor ointment. It is preferable to be moisturized rather than have the skin be dry. It is fine to wash your face with plain water or a mild cleanser such as Dove or Cetaphil during your treatment course.   ·  Itching and pain can be controlled with acetaminophen (Tylenol) or non-steroidal anti- inflammatory medication (ibuprofen, naproxen)   In Addition   ·  You can apply the Efudex with a Q-tip (if spot treating), gloves or fingers. If fingers are used, however, remember to wash your hands thoroughly afterwards to avoid irritation on normal skin.   ·  Care must be taken with Efudex during julia months because the medicine is reactive with the sun.  ·  Do NOT use Efudex on eyelids or lips unless specifically directed to do so.      Keep Efudex away from pets and kids it IS a chemotherapy medication when taken by mouth and we do not want them eating the  medicine accidentally!  ·  Care must be taken with Efudex in skin fold areas like the fold from the nose to the corner of the mouth. Try to avoid having any Efudex accumulate in those areas.   ·  Not all bumps will respond to the Efudex. There are benign types of keratoses that will not react to the medication (such as seborrheic keratoses). If the area treated is not getting red it does not mean the medicine isn't working.   ·  Consider the Efudex as an investment in the health of your skin; the benefits of “sticking it out” are worth it! Efudex is an excellent way to reverse many years of sun damage.   When to contact your provider   ·  Once you have completed your prescribed course of therapy, wait for a period of 4 weeks to see if treated areas resolve. If you have lesions that have not responded, make a follow-up appointment with your doctor in about 8 weeks. In evaluating unresponsive areas your doctor will need the skin completely “settled down” this usually takes a period of at least two months.   ·  If having extensive burning, itching, swelling or tenderness call the Dermatology Department to arrange an Efudex check with our nurse, who has extensive experience with Efudex treatment. Although the Efudex treatment sometimes produces dramatic redness, crusting and peeling, problems such as allergic reactions and infection are rare.     If you have any questions please call our office at [phone]          Patient has been cleared for yearly skin exams; hx of melanoma 5 years ago; BCC & SCC.       PROCEDURE:  DESTRUCTION OF PRE-MALIGNANT LESIONS  After a thorough discussion of treatment options and risk/benefits/alternatives (including but not limited to local pain, scarring, dyspigmentation, blistering, and possible superinfection), verbal and written consent were obtained and the aforementioned lesions were treated on with cryotherapy using liquid nitrogen x 1 cycle for 5-10 seconds.    TOTAL NUMBER of 2  pre-malignant lesions were treated today on the ANATOMIC LOCATION: left lateral neck & right dorsal hand.     The patient tolerated the procedure well, and after-care instructions were provided.        Scribe Attestation      I,:  Brittaney Tomlinson am acting as a scribe while in the presence of the attending physician.:       I,:  Rafa Guy MD personally performed the services described in this documentation    as scribed in my presence.:            Patient was seen and discussed with Dr. Guy.   KATHLEEN Seay 02/21/24     This encounter ([id number] or [id number]) has a MODERATE level of medical decision making (MDM) given the presence of at least 2 of the elements of MDM (below):  *MODERATE number and complexity of problems addressed: 1 or more chronic illnesses with exacerbation, progression, or side effects of treatment; OR 2 or more stable chronic illnesses; OR 1 undiagnosed new problem with uncertain prognosis; OR 1 acute illness with systemic symptoms; OR 1 acute uncomplicated injury  *MODERATE amount and/or complexity of data reviewed: this includes review of previous notes and/or lab tests, independent interpretation of tests performed by another healthcare professional, ordering tests, assessment requiring independent historian, or discussion with other healthcare professionals.  *MODERATE risk of complications and/or morbidity or mortality of patient management (for example, prescription drug management, decisions regarding minor surgery with identified patient or procedure risk factors).

## 2024-02-21 NOTE — PATIENT INSTRUCTIONS
TOTAL NUMBER of 2 pre-malignant lesions were treated today on the ANATOMIC LOCATION: left lateral neck,right dorsal hand.     Apply  thin layer of cream to forehead and scalp twice a day  for 2 weeks  Take a Break for 2-3 weeks   Apply thin layer of cream to forearm twice a day for 2 weeks  Sunscreen SPF 30 or more daily       The patient tolerated the procedure well, and after-care instructions were provided.

## 2024-02-26 ENCOUNTER — OFFICE VISIT (OUTPATIENT)
Dept: CARDIOLOGY CLINIC | Facility: CLINIC | Age: 69
End: 2024-02-26
Payer: COMMERCIAL

## 2024-02-26 VITALS
SYSTOLIC BLOOD PRESSURE: 140 MMHG | WEIGHT: 197 LBS | HEART RATE: 55 BPM | BODY MASS INDEX: 28.2 KG/M2 | DIASTOLIC BLOOD PRESSURE: 64 MMHG | HEIGHT: 70 IN | OXYGEN SATURATION: 98 %

## 2024-02-26 DIAGNOSIS — I49.1 PAC (PREMATURE ATRIAL CONTRACTION): ICD-10-CM

## 2024-02-26 DIAGNOSIS — I34.0 NONRHEUMATIC MITRAL VALVE REGURGITATION: ICD-10-CM

## 2024-02-26 DIAGNOSIS — I48.3 TYPICAL ATRIAL FLUTTER (HCC): Primary | ICD-10-CM

## 2024-02-26 DIAGNOSIS — Z98.890 STATUS POST ABLATION OF ATRIAL FLUTTER: ICD-10-CM

## 2024-02-26 DIAGNOSIS — Z86.79 STATUS POST ABLATION OF ATRIAL FLUTTER: ICD-10-CM

## 2024-02-26 PROBLEM — I48.4 ATYPICAL ATRIAL FLUTTER (HCC): Status: ACTIVE | Noted: 2024-02-26

## 2024-02-26 PROCEDURE — 99203 OFFICE O/P NEW LOW 30 MIN: CPT | Performed by: INTERNAL MEDICINE

## 2024-02-26 PROCEDURE — 93000 ELECTROCARDIOGRAM COMPLETE: CPT | Performed by: INTERNAL MEDICINE

## 2024-02-26 RX ORDER — ASPIRIN 81 MG/1
81 TABLET ORAL DAILY
COMMUNITY

## 2024-02-26 NOTE — PROGRESS NOTES
EPS Consultation/New Patient Evaluation - Mario Bowser 68 y.o. male MRN: [id number]           ASSESSMENT:  1. Typical atrial flutter (HCC)  POCT ECG      2. Status post ablation of atrial flutter        3. Nonrheumatic mitral valve regurgitation        4. PAC (premature atrial contraction)                PLAN:  This patient is remarkably healthy I did explain to him that he should check his pulse on a regular basis because if he had atrial flutter he will be prone to having atrial fibrillation in the future.  Additionally I reassured him that the extra beat that he feels now and then is likely his premature atrial contraction and it is completely harmless he was having them on his EKG today.  He exercises regularly I reviewed his lipid panel and it is excellent.  I also reviewed his previous echocardiographic report which I have attached to this and it shows mild mitral regurgitation I do not appreciate a murmur today at this point in time echocardiogram not indicated    All questions were answered and we will follow-up in 1 year's time    CC/HPI:   Presents to establish care his previous cardiology care was at [facility], [place].  According to records he is status post atrial flutter ablation he has been maintaining sinus rhythm he also has mild mitral regurgitation with normal ejection fraction.  Old records were reviewed as well as EKGs from prior office visits and echocardiographic reports.    He had an atrial flutter ablation in 2016.  Since that time he has been on baby aspirin.  He is run 24 marathons he is a retired  he worked 30 years and the [employer].  There is a family history of cancer but no family history of heart disease.  He rarely drinks alcohol he does not smoke or use illicit drugs    His favorite marathon was [employer].  He is using fluorouracil for skin cancer on his head      ROS   Occasional palpitation no chest pain with exertion no shortness of breath with exertion  "all other 12 point review of systems negative for complaints    Objective:     Vitals: Blood pressure 140/64, pulse 55, height 5' 10\" (1.778 m), weight 89.4 kg (197 lb), SpO2 98%., Body mass index is 28.27 kg/m².,        Physical Exam:    GEN: Mario Bowser appears well, alert and oriented x 3, pleasant and cooperative   HEENT: pupils equal, round, and reactive to light; extraocular muscles intact  NECK: supple, no carotid bruits   HEART: regular rhythm, normal S1 and S2, no murmurs, clicks, gallops or rubs   LUNGS: clear to auscultation bilaterally; no wheezes, rales, or rhonchi   ABDOMEN: normal bowel sounds, soft, no tenderness, no distention  EXTREMITIES: peripheral pulses normal; no clubbing, cyanosis, or edema  NEURO: no focal findings   SKIN: normal without suspicious lesions on exposed skin    Medications:      Current Outpatient Medications:     aspirin (Aspirin 81) 81 mg EC tablet, Take 81 mg by mouth daily, Disp: , Rfl:     aspirin (ECOTRIN) 325 mg EC tablet, Take 325 mg by mouth, Disp: , Rfl:     fluorouracil (EFUDEX) 5 % cream, Apply topically 2 (two) times a day Apply 2x/day to scalp for 2 weeks and then after 2-4 week break, apply 2x/day to forearms for 2 weeks. If very irritating, apply vaseline and take a break for a few days. (Patient not taking: Reported on 2/26/2024), Disp: 40 g, Rfl: 3     Family History   Problem Relation Age of Onset    Rectal cancer Paternal Grandmother      Social History     Socioeconomic History    Marital status: /Civil Union     Spouse name: Not on file    Number of children: Not on file    Years of education: Not on file    Highest education level: Not on file   Occupational History    Not on file   Tobacco Use    Smoking status: Never    Smokeless tobacco: Never   Vaping Use    Vaping status: Never Used   Substance and Sexual Activity    Alcohol use: Not Currently    Drug use: Never    Sexual activity: Yes     Partners: Female   Other Topics Concern    Not on " file   Social History Narrative    Not on file     Social Determinants of Health     Financial Resource Strain: Not on file   Food Insecurity: Not on file   Transportation Needs: Not on file   Physical Activity: Not on file   Stress: Not on file   Social Connections: Not on file   Intimate Partner Violence: Not on file   Housing Stability: Not on file     Social History     Tobacco Use   Smoking Status Never   Smokeless Tobacco Never     Social History     Substance and Sexual Activity   Alcohol Use Not Currently       Labs & Results:  Below is the patient's most recent value for Albumin, ALT, AST, BUN, Calcium, Chloride, Cholesterol, CO2, Creatinine, GFR, Glucose, HDL, Hematocrit, Hemoglobin, Hemoglobin A1C, LDL, Magnesium, Phosphorus, Platelets, Potassium, PSA, Sodium, Triglycerides, and WBC.   Lab Results   Component Value Date    PSA 4.00 01/17/2024     Note: for a comprehensive list of the patient's lab results, access the Results Review activity.            Cardiac testing:   No results found for this or any previous visit.    No results found for this or any previous visit.    No results found for this or any previous visit.    No results found for this or any previous visit.

## 2024-08-26 ENCOUNTER — APPOINTMENT (OUTPATIENT)
Dept: LAB | Facility: CLINIC | Age: 69
End: 2024-08-26
Payer: COMMERCIAL

## 2024-08-26 DIAGNOSIS — R97.20 ELEVATED PSA: ICD-10-CM

## 2024-08-26 LAB — PSA SERPL-MCNC: 4.32 NG/ML (ref 0–4)

## 2024-08-26 PROCEDURE — 36415 COLL VENOUS BLD VENIPUNCTURE: CPT

## 2024-08-26 PROCEDURE — 84153 ASSAY OF PSA TOTAL: CPT

## 2024-09-04 ENCOUNTER — OFFICE VISIT (OUTPATIENT)
Dept: UROLOGY | Facility: AMBULATORY SURGERY CENTER | Age: 69
End: 2024-09-04
Payer: COMMERCIAL

## 2024-09-04 VITALS
BODY MASS INDEX: 26.83 KG/M2 | DIASTOLIC BLOOD PRESSURE: 70 MMHG | WEIGHT: 187 LBS | OXYGEN SATURATION: 98 % | HEART RATE: 55 BPM | SYSTOLIC BLOOD PRESSURE: 122 MMHG

## 2024-09-04 DIAGNOSIS — R97.20 ELEVATED PSA: Primary | ICD-10-CM

## 2024-09-04 DIAGNOSIS — N13.8 BPH WITH OBSTRUCTION/LOWER URINARY TRACT SYMPTOMS: ICD-10-CM

## 2024-09-04 DIAGNOSIS — N40.1 BPH WITH OBSTRUCTION/LOWER URINARY TRACT SYMPTOMS: ICD-10-CM

## 2024-09-04 PROCEDURE — 99213 OFFICE O/P EST LOW 20 MIN: CPT

## 2024-09-04 NOTE — ASSESSMENT & PLAN NOTE
Patient's most recent PSA was performed 8/26/2024 which returned elevated at a value of 4.323.  The patient did note that he was lawnmower riding the day prior to his repeat PSA.  Refer to PSA trend below.  ELLI performed January 2024 noted a palpably benign but enlarged prostate.  Patient underwent multiparametric MRI of the prostate on 2/8/2024 which revealed PI-RADS category 2 with no suspicious lesion concerning for prostate cancer and a prostate volume 68.5 g.  We discussed today that the patient's elevated PSA may be related to his PSA density as a prostate of 68.5 g may produce a PSA as high as 6.8.  Additionally, we discussed that the patient should refrain from any sexual activity, bike riding, or lawnmower riding up to 3 days prior to undergoing repeat PSA testing.  The patient will follow-up in 6 months with a repeat PSA and a ELLI at that time.    PSA Trend:  4.323 - 8/26/24  2.77 - 5/30/24  4.0 - 1/17/24  5.5 - 10/2022  3.17 - 9/7/22

## 2024-09-04 NOTE — PROGRESS NOTES
"9/4/2024      Assessment and Plan    68 y.o. male managed by Dr. Gross    Elevated PSA  Patient's most recent PSA was performed 8/26/2024 which returned elevated at a value of 4.323.  The patient did note that he was lawnmower riding the day prior to his repeat PSA.  Refer to PSA trend below.  ELLI performed January 2024 noted a palpably benign but enlarged prostate.  Patient underwent multiparametric MRI of the prostate on 2/8/2024 which revealed PI-RADS category 2 with no suspicious lesion concerning for prostate cancer and a prostate volume 68.5 g.  We discussed today that the patient's elevated PSA may be related to his PSA density as a prostate of 68.5 g may produce a PSA as high as 6.8.  Additionally, we discussed that the patient should refrain from any sexual activity, bike riding, or lawnmower riding up to 3 days prior to undergoing repeat PSA testing.  The patient will follow-up in 6 months with a repeat PSA and a ELLI at that time.    PSA Trend:  4.323 - 8/26/24  2.77 - 5/30/24  4.0 - 1/17/24  5.5 - 10/2022  3.17 - 9/7/22      BPH with obstruction/lower urinary tract symptoms  Patient reports feelings of incomplete bladder emptying and urinary frequency  We discussed pharmacotherapy in the forms of a alpha-blocker versus a 5 alpha reductase inhibitor.  However, the patient defers pharmacotherapy at this time.  We discussed prostate supplement such as saw palmetto but the patient defers this as an option as well.  We will continue to monitor for worsening/progression of lower urinary tract symptoms and the patient should undergo a PVR at his follow-up visit        History of Present Illness  Mario Bowser is a 68 y.o. male here for evaluation of elevated PSA.  Patient was last seen in the office on 1/16/2024.  Patient previously denied a family history of prostate cancer, but did note a family history of colon cancer and \"bone cancer\".  Patient had a PSA as high as 5.5 on November 2022.  Patient had a " PSA performed 1/17/2024 which returned at a value of 4.0.  Patient underwent a multiparametric MRI of the prostate on 2/8/2024 which revealed PI-RADS category 2 with no suspicious lesion concerning for prostate cancer revealed on MRI and a prostate volume of 68.5 g.  Repeat PSA testing performed 5/30/2024 returned at a value of 2.77.  Most recently the patient underwent PSA testing on 8/26/2024 which returned at a value of 4.323.  Today, the patient reports that he was lawnmower riding as he was cutting his lawn a day prior to him avoiding his repeat PSA testing and attributes this to his elevated PSA value.  Patient does note that his PSA performed on 5/30/2024 he did not perform any activities that may falsely elevate his PSA.  Otherwise, patient does not know feelings of incomplete bladder emptying but states that he has a good urinary stream and denies any worsening urinary urgency.  Patient does note some degree of urinary frequency, but is overall content with his current voiding pattern off of pharmacotherapy.  Furthermore, the patient notes rectal leakage of soft stool.        Review of Systems   Constitutional:  Negative for chills and fever.   HENT:  Negative for ear pain and sore throat.    Eyes:  Negative for pain and visual disturbance.   Respiratory:  Negative for cough and shortness of breath.    Cardiovascular:  Negative for chest pain and palpitations.   Gastrointestinal:  Negative for abdominal pain and vomiting.   Genitourinary:  Positive for frequency. Negative for decreased urine volume, difficulty urinating, dysuria, flank pain, hematuria and urgency.   Musculoskeletal:  Negative for arthralgias and back pain.   Skin:  Negative for color change and rash.   Neurological:  Negative for seizures and syncope.   All other systems reviewed and are negative.               Vitals  Vitals:    09/04/24 1531   BP: 122/70   BP Location: Left arm   Patient Position: Sitting   Cuff Size: Adult   Pulse: 55    SpO2: 98%   Weight: 84.8 kg (187 lb)       Physical Exam  Vitals reviewed.   Constitutional:       General: He is not in acute distress.     Appearance: Normal appearance. He is not ill-appearing.   HENT:      Head: Normocephalic and atraumatic.      Nose: Nose normal.   Eyes:      General: No scleral icterus.  Pulmonary:      Effort: No respiratory distress.   Abdominal:      General: Abdomen is flat. There is no distension.      Palpations: Abdomen is soft.      Tenderness: There is no abdominal tenderness.   Musculoskeletal:         General: Normal range of motion.      Cervical back: Normal range of motion.   Skin:     General: Skin is warm.      Coloration: Skin is not jaundiced.   Neurological:      Mental Status: He is alert and oriented to person, place, and time.      Gait: Gait normal.   Psychiatric:         Mood and Affect: Mood normal.         Behavior: Behavior normal.           Past History  History reviewed. No pertinent past medical history.  Social History     Socioeconomic History    Marital status: /Civil Union     Spouse name: None    Number of children: None    Years of education: None    Highest education level: None   Occupational History    None   Tobacco Use    Smoking status: Never    Smokeless tobacco: Never   Vaping Use    Vaping status: Never Used   Substance and Sexual Activity    Alcohol use: Not Currently    Drug use: Never    Sexual activity: Yes     Partners: Female   Other Topics Concern    None   Social History Narrative    None     Social Determinants of Health     Financial Resource Strain: Not on file   Food Insecurity: Not on file   Transportation Needs: Not on file   Physical Activity: Not on file   Stress: Not on file   Social Connections: Not on file   Intimate Partner Violence: Not on file   Housing Stability: Not on file     Social History     Tobacco Use   Smoking Status Never   Smokeless Tobacco Never     Family History   Problem Relation Age of Onset    Rectal  "cancer Paternal Grandmother        The following portions of the patient's history were reviewed and updated as appropriate: allergies, current medications, past medical history, past social history, past surgical history and problem list.    Results  No results found for this or any previous visit (from the past 1 hour(s)).]  Lab Results   Component Value Date    PSA 4.323 (H) 08/26/2024    PSA 4.00 01/17/2024     No results found for: \"GLUCOSE\", \"CALCIUM\", \"NA\", \"K\", \"CO2\", \"CL\", \"BUN\", \"CREATININE\"  No results found for: \"WBC\", \"HGB\", \"HCT\", \"MCV\", \"PLT\"   "

## 2024-09-04 NOTE — ASSESSMENT & PLAN NOTE
Patient reports feelings of incomplete bladder emptying and urinary frequency  We discussed pharmacotherapy in the forms of a alpha-blocker versus a 5 alpha reductase inhibitor.  However, the patient defers pharmacotherapy at this time.  We discussed prostate supplement such as saw palmetto but the patient defers this as an option as well.  We will continue to monitor for worsening/progression of lower urinary tract symptoms and the patient should undergo a PVR at his follow-up visit

## 2025-02-25 ENCOUNTER — APPOINTMENT (OUTPATIENT)
Dept: LAB | Facility: CLINIC | Age: 70
End: 2025-02-25
Payer: COMMERCIAL

## 2025-02-25 DIAGNOSIS — R97.20 ELEVATED PSA: ICD-10-CM

## 2025-02-25 LAB — PSA SERPL-MCNC: 4.64 NG/ML (ref 0–4)

## 2025-02-25 PROCEDURE — 36415 COLL VENOUS BLD VENIPUNCTURE: CPT

## 2025-02-25 PROCEDURE — 84153 ASSAY OF PSA TOTAL: CPT

## 2025-02-26 ENCOUNTER — OFFICE VISIT (OUTPATIENT)
Dept: DERMATOLOGY | Facility: CLINIC | Age: 70
End: 2025-02-26
Payer: COMMERCIAL

## 2025-02-26 VITALS — TEMPERATURE: 97.9 F | WEIGHT: 188.4 LBS | HEIGHT: 70 IN | BODY MASS INDEX: 26.97 KG/M2

## 2025-02-26 DIAGNOSIS — D22.9 MULTIPLE BENIGN MELANOCYTIC NEVI: ICD-10-CM

## 2025-02-26 DIAGNOSIS — L81.4 LENTIGO: ICD-10-CM

## 2025-02-26 DIAGNOSIS — L82.1 SEBORRHEIC KERATOSIS: ICD-10-CM

## 2025-02-26 DIAGNOSIS — L57.0 ACTINIC KERATOSIS: Primary | ICD-10-CM

## 2025-02-26 PROCEDURE — 17003 DESTRUCT PREMALG LES 2-14: CPT | Performed by: STUDENT IN AN ORGANIZED HEALTH CARE EDUCATION/TRAINING PROGRAM

## 2025-02-26 PROCEDURE — 99214 OFFICE O/P EST MOD 30 MIN: CPT | Performed by: STUDENT IN AN ORGANIZED HEALTH CARE EDUCATION/TRAINING PROGRAM

## 2025-02-26 PROCEDURE — 17000 DESTRUCT PREMALG LESION: CPT | Performed by: STUDENT IN AN ORGANIZED HEALTH CARE EDUCATION/TRAINING PROGRAM

## 2025-02-26 NOTE — PATIENT INSTRUCTIONS
"MELANOCYTIC NEVI (\"Moles\")    Assessment and Plan:  Based on a thorough discussion of this condition and the management approach to it (including a comprehensive discussion of the known risks, side effects and potential benefits of treatment), the patient (family) agrees to implement the following specific plan:  When outside we recommend using a wide brim hat, sunglasses, long sleeve and pants, sunscreen with SPF 30+ with reapplication every 2 hours, or SPF specific clothing   Benign, reassured  Annual skin check     Melanocytic Nevi  Melanocytic nevi (\"moles\") are tan or brown, raised or flat areas of the skin which have an increased number of melanocytes. Melanocytes are the cells in our body which make pigment and account for skin color.    Some moles are present at birth (I.e., \"congenital nevi\"), while others come up later in life (i.e., \"acquired nevi\").  The sun can stimulate the body to make more moles.  Sunburns are not the only thing that triggers more moles.  Chronic sun exposure can do it too.     Clinically distinguishing a healthy mole from melanoma may be difficult, even for experienced dermatologists. The \"ABCDE's\" of moles have been suggested as a means of helping to alert a person to a suspicious mole and the possible increased risk of melanoma.  The suggestions for raising alert are as follows:    Asymmetry: Healthy moles tend to be symmetric, while melanomas are often asymmetric.  Asymmetry means if you draw a line through the mole, the two halves do not match in color, size, shape, or surface texture. Asymmetry can be a result of rapid enlargement of a mole, the development of a raised area on a previously flat lesion, scaling, ulceration, bleeding or scabbing within the mole.  Any mole that starts to demonstrate \"asymmetry\" should be examined promptly by a board certified dermatologist.     Border: Healthy moles tend to have discrete, even borders.  The border of a melanoma often blends into " "the normal skin and does not sharply delineate the mole from normal skin.  Any mole that starts to demonstrate \"uneven borders\" should be examined promptly by a board certified dermatologist.     Color: Healthy moles tend to be one color throughout.  Melanomas tend to be made up of different colors ranging from dark black, blue, white, or red.  Any mole that demonstrates a color change should be examined promptly by a board certified dermatologist.     Diameter: Healthy moles tend to be smaller than 0.6 cm in size; an exception are \"congenital nevi\" that can be larger.  Melanomas tend to grow and can often be greater than 0.6 cm (1/4 of an inch, or the size of a pencil eraser). This is only a guideline, and many normal moles may be larger than 0.6 cm without being unhealthy.  Any mole that starts to change in size (small to bigger or bigger to smaller) should be examined promptly by a board certified dermatologist.     Evolving: Healthy moles tend to \"stay the same.\"  Melanomas may often show signs of change or evolution such as a change in size, shape, color, or elevation.  Any mole that starts to itch, bleed, crust, burn, hurt, or ulcerate or demonstrate a change or evolution should be examined promptly by a board certified dermatologist.      LENTIGO      Assessment and Plan:  Based on a thorough discussion of this condition and the management approach to it (including a comprehensive discussion of the known risks, side effects and potential benefits of treatment), the patient (family) agrees to implement the following specific plan:  When outside we recommend using a wide brim hat, sunglasses, long sleeve and pants, sunscreen with SPF 30+ with reapplication every 2 hours, or SPF specific clothing     What is a lentigo?  A lentigo is a pigmented flat or slightly raised lesion with a clearly defined edge. Unlike an ephelis (freckle), it does not fade in the winter months. There are several kinds of lentigo.  The " name lentigo originally referred to its appearance resembling a small lentil. The plural of lentigo is lentigines, although “lentigos” is also in common use.    Who gets lentigines?  Lentigines can affect males and females of all ages and races. Solar lentigines are especially prevalent in fair skinned adults. Lentigines associated with syndromes are present at birth or arise during childhood.    What causes lentigines?  Common forms of lentigo are due to exposure to ultraviolet radiation:  Sun damage including sunburn   Indoor tanning   Phototherapy, especially photochemotherapy (PUVA)    Ionizing radiation, eg radiation therapy, can also cause lentigines.  Several familial syndromes associated with widespread lentigines originate from mutations in Paul-MAP kinase, mTOR signaling and PTEN pathways.    What is the treatment for lentigines?  Most lentigines are left alone. Attempts to lighten them may not be successful. The following approaches are used:  SPF 50+ broad-spectrum sunscreen   Hydroquinone bleaching cream   Alpha hydroxy acids   Vitamin C   Retinoids   Azelaic acid   Chemical peels  Individual lesions can be permanently removed using:  Cryotherapy   Intense pulsed light   Pigment lasers    How can lentigines be prevented?  Lentigines associated with exposure ultraviolet radiation can be prevented by very careful sun protection. Clothing is more successful at preventing new lentigines than are sunscreens.    What is the outlook for lentigines?  Lentigines usually persist. They may increase in number with age and sun exposure. Some in sun-protected sites may fade and disappear.    SEBORRHEIC KERATOSIS; NON-INFLAMED    Assessment and Plan:  Based on a thorough discussion of this condition and the management approach to it (including a comprehensive discussion of the known risks, side effects and potential benefits of treatment), the patient (family) agrees to implement the following specific plan:  Monitor  "for changes  Benign, reassured    Seborrheic Keratosis  A seborrheic keratosis is a harmless warty spot that appears during adult life as a common sign of skin aging.  Seborrheic keratoses can arise on any area of skin, covered or uncovered, with the usual exception of the palms and soles. They do not arise from mucous membranes. Seborrheic keratoses can have highly variable appearance.      Seborrheic keratoses are extremely common. It has been estimated that over 90% of adults over the age of 60 years have one or more of them. They occur in males and females of all races, typically beginning to erupt in the 30s or 40s. They are uncommon under the age of 20 years.  The precise cause of seborrhoeic keratoses is not known.  Seborrhoeic keratoses are considered degenerative in nature. As time goes by, seborrheic keratoses tend to become more numerous. Some people inherit a tendency to develop a very large number of them; some people may have hundreds of them.      There is no easy way to remove multiple lesions on a single occasion.  Unless a specific lesion is \"inflamed\" and is causing pain or stinging/burning or is bleeding, most insurance companies do not authorize treatment.    ACTINIC KERATOSIS    Plan:  PRESCRIPTION MANAGEMENT:  Several topical management options and their side effects were discussed including \"field therapies\" such as Efudex (5-fluorouracil) and/or Aldara (imiquimod). Efudex may be used alone or in combination with Calcipotriene. Begin treatment once regions that have been sprayed with liquid nitrogen are healed. Redness and irritation are to be expected within a few days of field therapy treatment and should resolve within 1 to 2 weeks following treatment. Do NOT cover the treatment areas with bandages. Use a sunscreen with an SPF 50+ while using field therapy.  We discussed the pre-cancerous nature of the condition. Actinic keratosis is found on sun-damaged skin and there is small risk that " the condition could turn into a skin cancer called squamous cell carcinoma. There is no risk of actinic keratosis turning into melanoma.  Proliferative actinic keratosis tends to be resistant against standard treatments, including topical therapies and cryotherapy. It has a tendency to develop into infiltrative squamous cell carcinoma. Excision may be considered.  We discussed sun protection measures, including using sunscreen with an SPF 50+ year round, avoiding the sun, and wearing protective clothing such as long sleeves and pants when out in the sun.  Continue to monitor clinically for signs of recurrence. Discussed with patient the importance of keeping up to date with full body skin exams.  Cryotherapy performed in the office (See Procedure Note). We discussed that a hypopigmentation or scar may form in the region following cryotherapy.     PROCEDURES PERFORMED TODAY ASSOCIATED WITH THIS CONDITION:          Cryotherapy: PROCEDURE:  DESTRUCTION OF PRE-MALIGNANT LESIONS  After a thorough discussion of treatment options and risk/benefits/alternatives (including but not limited to local pain, scarring, dyspigmentation, blistering, and possible superinfection), verbal and written consent were obtained and the aforementioned lesions were treated on with cryotherapy using liquid nitrogen x 1 cycle for 5-10 seconds.    TOTAL NUMBER of 6 pre-malignant lesions were treated today on the ANATOMIC LOCATION:  Right dorsal hand, right shin,x2 right cheek x2 scalp.     The patient tolerated the procedure well, and after-care instructions were provided.         MEDICAL DECISION MAKING  Treatment Goal:  Resolution of this ACUTE, UNCOMPLICATED condition.       A recent or new short-term problem for which treatment is CONSIDERED OR INITIATED. There is little to no risk of mortality with treatment, and full recovery without functional impairment is expected.  Diagnosis or treatment significantly limited by the following social  determinants of health:  NONE IDENTIFIED

## 2025-02-26 NOTE — PROGRESS NOTES
"[facility] Note     Patient Name: Mario Bwoser  Encounter Date: 2/26/25     Have you been cared for by a [facility] Dermatologist in the last 3 years and, if so, which description applies to you?    Yes.  I have been here within the last 3 years, and my medical history has NOT changed since that time.  I am MALE/not capable of bearing children.    REVIEW OF SYSTEMS:  Have you recently had or currently have any of the following? No changes in my recent health.   PAST MEDICAL HISTORY:  Have you personally ever had or currently have any of the following?  If \"YES,\" then please provide more detail. No changes in my medical history.   HISTORY OF IMMUNOSUPPRESSION: Do you have a history of any of the following:  Systemic Immunosuppression such as Diabetes, Biologic or Immunotherapy, Chemotherapy, Organ Transplantation, Bone Marrow Transplantation or Prednisone?  YES, Recently used Efudex cream     Answering \"YES\" requires the addition of the dotphrase \"IMMUNOSUPPRESSED\" as the first diagnosis of the patient's visit.   FAMILY HISTORY:  Any \"first degree relatives\" (parent, brother, sister, or child) with the following?    No changes in my family's known health.   PATIENT EXPERIENCE:    Do you want the Dermatologist to perform a COMPLETE skin exam today including a clinical examination under the \"bra and underwear\" areas?  Yes  If necessary, do we have your permission to call and leave a detailed message on your Preferred Phone number that includes your specific medical information?  Yes      Allergies   Allergen Reactions    Other Other (See Comments)     Seasonal allergy    Dm-Doxylamine-Acetaminophen Other (See Comments)     JITTERY    Levofloxacin Other (See Comments)     TENDONITIS - NEVER TOOK IT BUT HAS HAD TENDINITIS AND DOESN'T WANT TO TAKE IT      Current Outpatient Medications:     aspirin (Aspirin 81) 81 mg EC tablet, Take 81 mg by mouth daily, Disp: , Rfl:     fluorouracil (EFUDEX) 5 % " "cream, Apply topically 2 (two) times a day Apply 2x/day to scalp for 2 weeks and then after 2-4 week break, apply 2x/day to forearms for 2 weeks. If very irritating, apply vaseline and take a break for a few days. (Patient not taking: Reported on 2/26/2024), Disp: 40 g, Rfl: 3          Whom besides the patient is providing clinical information about today's encounter?   NO ADDITIONAL HISTORIAN (patient alone provided history)    Physical Exam and Assessment/Plan by Diagnosis:  Patient has been cleared for yearly skin exams; hx of melanoma 7 years ago; BCC & SCC.     History of Melanoma  Physical Exam:  Anatomic Location Affected:  R forehead   Morphological Description:  well-healed scar  Pertinent Positives:  Pertinent Negatives:    Additional History of Present Condition:  Patient unsure of details, believes it was 7 years ago, MIS.    Assessment and Plan:  Based on a thorough discussion of this condition and the management approach to it (including a comprehensive discussion of the known risks, side effects and potential benefits of treatment), the patient (family) agrees to implement the following specific plan:  Skin checks anually    MELANOCYTIC NEVI (\"Moles\")    Physical Exam:  Anatomic Location Affected:   Mostly on sun-exposed areas of the trunk and extremities  Morphological Description:  Scattered, 1-4mm round to ovoid, symmetrical-appearing, even bordered, skin colored to dark brown macules/papules, mostly in sun-exposed areas  Pertinent Positives:  Pertinent Negatives:    Additional History of Present Condition:  Patient presents today for a skin check. Patient was last seen 2/21/24.  Patient was given Efudex cream to apply to the forehead and scalp  and also had cryotherapy to spots done at that visit. Patient also has a hx of Melanoma by the right temple about 7 years ago or more, SCC and BCC. Patient has a few spots of concern today. Left leg on the side of the knee, right shin. Denies itching or " "bleeding from either sites.     Assessment and Plan:  Based on a thorough discussion of this condition and the management approach to it (including a comprehensive discussion of the known risks, side effects and potential benefits of treatment), the patient (family) agrees to implement the following specific plan:  When outside we recommend using a wide brim hat, sunglasses, long sleeve and pants, sunscreen with SPF 30+ with reapplication every 2 hours, or SPF specific clothing   Benign, reassured  Annual skin check     Melanocytic Nevi  Melanocytic nevi (\"moles\") are tan or brown, raised or flat areas of the skin which have an increased number of melanocytes. Melanocytes are the cells in our body which make pigment and account for skin color.    Some moles are present at birth (I.e., \"congenital nevi\"), while others come up later in life (i.e., \"acquired nevi\").  The sun can stimulate the body to make more moles.  Sunburns are not the only thing that triggers more moles.  Chronic sun exposure can do it too.     Clinically distinguishing a healthy mole from melanoma may be difficult, even for experienced dermatologists. The \"ABCDE's\" of moles have been suggested as a means of helping to alert a person to a suspicious mole and the possible increased risk of melanoma.  The suggestions for raising alert are as follows:    Asymmetry: Healthy moles tend to be symmetric, while melanomas are often asymmetric.  Asymmetry means if you draw a line through the mole, the two halves do not match in color, size, shape, or surface texture. Asymmetry can be a result of rapid enlargement of a mole, the development of a raised area on a previously flat lesion, scaling, ulceration, bleeding or scabbing within the mole.  Any mole that starts to demonstrate \"asymmetry\" should be examined promptly by a board certified dermatologist.     Border: Healthy moles tend to have discrete, even borders.  The border of a melanoma often blends into " "the normal skin and does not sharply delineate the mole from normal skin.  Any mole that starts to demonstrate \"uneven borders\" should be examined promptly by a board certified dermatologist.     Color: Healthy moles tend to be one color throughout.  Melanomas tend to be made up of different colors ranging from dark black, blue, white, or red.  Any mole that demonstrates a color change should be examined promptly by a board certified dermatologist.     Diameter: Healthy moles tend to be smaller than 0.6 cm in size; an exception are \"congenital nevi\" that can be larger.  Melanomas tend to grow and can often be greater than 0.6 cm (1/4 of an inch, or the size of a pencil eraser). This is only a guideline, and many normal moles may be larger than 0.6 cm without being unhealthy.  Any mole that starts to change in size (small to bigger or bigger to smaller) should be examined promptly by a board certified dermatologist.     Evolving: Healthy moles tend to \"stay the same.\"  Melanomas may often show signs of change or evolution such as a change in size, shape, color, or elevation.  Any mole that starts to itch, bleed, crust, burn, hurt, or ulcerate or demonstrate a change or evolution should be examined promptly by a board certified dermatologist.      LENTIGO    Physical Exam:  Anatomic Location Affected:  trunk, arms  Morphological Description:  Light brown macules  Pertinent Positives:  Pertinent Negatives:    Assessment and Plan:  Based on a thorough discussion of this condition and the management approach to it (including a comprehensive discussion of the known risks, side effects and potential benefits of treatment), the patient (family) agrees to implement the following specific plan:  When outside we recommend using a wide brim hat, sunglasses, long sleeve and pants, sunscreen with SPF 30+ with reapplication every 2 hours, or SPF specific clothing     What is a lentigo?  A lentigo is a pigmented flat or slightly " raised lesion with a clearly defined edge. Unlike an ephelis (freckle), it does not fade in the winter months. There are several kinds of lentigo.  The name lentigo originally referred to its appearance resembling a small lentil. The plural of lentigo is lentigines, although “lentigos” is also in common use.    Who gets lentigines?  Lentigines can affect males and females of all ages and races. Solar lentigines are especially prevalent in fair skinned adults. Lentigines associated with syndromes are present at birth or arise during childhood.    What causes lentigines?  Common forms of lentigo are due to exposure to ultraviolet radiation:  Sun damage including sunburn   Indoor tanning   Phototherapy, especially photochemotherapy (PUVA)    Ionizing radiation, eg radiation therapy, can also cause lentigines.  Several familial syndromes associated with widespread lentigines originate from mutations in Paul-MAP kinase, mTOR signaling and PTEN pathways.    What is the treatment for lentigines?  Most lentigines are left alone. Attempts to lighten them may not be successful. The following approaches are used:  SPF 50+ broad-spectrum sunscreen   Hydroquinone bleaching cream   Alpha hydroxy acids   Vitamin C   Retinoids   Azelaic acid   Chemical peels  Individual lesions can be permanently removed using:  Cryotherapy   Intense pulsed light   Pigment lasers    How can lentigines be prevented?  Lentigines associated with exposure ultraviolet radiation can be prevented by very careful sun protection. Clothing is more successful at preventing new lentigines than are sunscreens.    What is the outlook for lentigines?  Lentigines usually persist. They may increase in number with age and sun exposure. Some in sun-protected sites may fade and disappear.    SEBORRHEIC KERATOSIS; NON-INFLAMED    Physical Exam:  Anatomic Location Affected: Mostly on sun-exposed areas of the trunk and extremities  Morphological Description:  Flat and  "raised, waxy, smooth to warty textured, yellow to brownish-grey to dark brown to blackish, discrete, \"stuck-on\" appearing papules.  Pertinent Positives:  Pertinent Negatives:    Assessment and Plan:  Based on a thorough discussion of this condition and the management approach to it (including a comprehensive discussion of the known risks, side effects and potential benefits of treatment), the patient (family) agrees to implement the following specific plan:  Monitor for changes  Benign, reassured    Seborrheic Keratosis  A seborrheic keratosis is a harmless warty spot that appears during adult life as a common sign of skin aging.  Seborrheic keratoses can arise on any area of skin, covered or uncovered, with the usual exception of the palms and soles. They do not arise from mucous membranes. Seborrheic keratoses can have highly variable appearance.      Seborrheic keratoses are extremely common. It has been estimated that over 90% of adults over the age of 60 years have one or more of them. They occur in males and females of all races, typically beginning to erupt in the 30s or 40s. They are uncommon under the age of 20 years.  The precise cause of seborrhoeic keratoses is not known.  Seborrhoeic keratoses are considered degenerative in nature. As time goes by, seborrheic keratoses tend to become more numerous. Some people inherit a tendency to develop a very large number of them; some people may have hundreds of them.      There is no easy way to remove multiple lesions on a single occasion.  Unless a specific lesion is \"inflamed\" and is causing pain or stinging/burning or is bleeding, most insurance companies do not authorize treatment.    ACTINIC KERATOSIS    Physical Exam:  Anatomic Location: Right dorsal hand, right shin,x2 right cheek x2 scalp  Morphologic Description:  Scaly pink papules  Pertinent Positives:  Pertinent Negatives:    Additional History of Present Condition:    History of bleeding from this " "lesion? NO  History of pain, tenderness, and/or itching within this lesion? YES, Itchy  Personal history of skin cancer? YES, Melanoma,SCC,BCC  Family history of skin cancer? YES, Father  Previous treatment to the same site? NO    Plan:  PRESCRIPTION MANAGEMENT:  Several topical management options and their side effects were discussed including \"field therapies\" such as Efudex (5-fluorouracil) and/or Aldara (imiquimod). Efudex may be used alone or in combination with Calcipotriene. Begin treatment once regions that have been sprayed with liquid nitrogen are healed. Redness and irritation are to be expected within a few days of field therapy treatment and should resolve within 1 to 2 weeks following treatment. Do NOT cover the treatment areas with bandages. Use a sunscreen with an SPF 50+ while using field therapy.  We discussed the pre-cancerous nature of the condition. Actinic keratosis is found on sun-damaged skin and there is small risk that the condition could turn into a skin cancer called squamous cell carcinoma. There is no risk of actinic keratosis turning into melanoma.  Proliferative actinic keratosis tends to be resistant against standard treatments, including topical therapies and cryotherapy. It has a tendency to develop into infiltrative squamous cell carcinoma. Excision may be considered.  We discussed sun protection measures, including using sunscreen with an SPF 50+ year round, avoiding the sun, and wearing protective clothing such as long sleeves and pants when out in the sun.  Continue to monitor clinically for signs of recurrence. Discussed with patient the importance of keeping up to date with full body skin exams.  Cryotherapy performed in the office (See Procedure Note). We discussed that a hypopigmentation or scar may form in the region following cryotherapy.     PROCEDURES PERFORMED TODAY ASSOCIATED WITH THIS CONDITION:          Cryotherapy: PROCEDURE:  DESTRUCTION OF PRE-MALIGNANT " LESIONS  After a thorough discussion of treatment options and risk/benefits/alternatives (including but not limited to local pain, scarring, dyspigmentation, blistering, and possible superinfection), verbal and written consent were obtained and the aforementioned lesions were treated on with cryotherapy using liquid nitrogen x 1 cycle for 5-10 seconds.    TOTAL NUMBER of 6 pre-malignant lesions were treated today on the ANATOMIC LOCATION:  Right dorsal hand, right shin,x2 right cheek x2 scalp.     The patient tolerated the procedure well, and after-care instructions were provided.         MEDICAL DECISION MAKING  Treatment Goal:  Resolution of this ACUTE, UNCOMPLICATED condition.       A recent or new short-term problem for which treatment is CONSIDERED OR INITIATED. There is little to no risk of mortality with treatment, and full recovery without functional impairment is expected.  Diagnosis or treatment significantly limited by the following social determinants of health:  NONE IDENTIFIED             Palomo Esparza MD  PGY-II Dermatology Resident     Scribe Attestation      I,:  Sharlene Miller am acting as a scribe while in the presence of the attending physician.:       I,:  Rafa Guy MD personally performed the services described in this documentation    as scribed in my presence.:            This encounter (62103 or [id number]) has a MODERATE level of medical decision making (MDM) given the presence of at least 2 of the elements of MDM (below):  *MODERATE number and complexity of problems addressed: 1 or more chronic illnesses with exacerbation, progression, or side effects of treatment; OR 2 or more stable chronic illnesses; OR 1 undiagnosed new problem with uncertain prognosis; OR 1 acute illness with systemic symptoms; OR 1 acute uncomplicated injury  *MODERATE amount and/or complexity of data reviewed: this includes review of previous notes and/or lab tests, independent interpretation of tests performed by  another healthcare professional, ordering tests, assessment requiring independent historian, or discussion with other healthcare professionals.  *MODERATE risk of complications and/or morbidity or mortality of patient management (for example, prescription drug management, decisions regarding minor surgery with identified patient or procedure risk factors).

## 2025-03-04 ENCOUNTER — OFFICE VISIT (OUTPATIENT)
Dept: UROLOGY | Facility: AMBULATORY SURGERY CENTER | Age: 70
End: 2025-03-04
Payer: COMMERCIAL

## 2025-03-04 VITALS
OXYGEN SATURATION: 99 % | HEART RATE: 56 BPM | WEIGHT: 188 LBS | DIASTOLIC BLOOD PRESSURE: 74 MMHG | BODY MASS INDEX: 26.92 KG/M2 | HEIGHT: 70 IN | SYSTOLIC BLOOD PRESSURE: 126 MMHG

## 2025-03-04 DIAGNOSIS — N40.1 BPH WITH OBSTRUCTION/LOWER URINARY TRACT SYMPTOMS: Primary | ICD-10-CM

## 2025-03-04 DIAGNOSIS — R97.20 ELEVATED PSA: ICD-10-CM

## 2025-03-04 DIAGNOSIS — N13.8 BPH WITH OBSTRUCTION/LOWER URINARY TRACT SYMPTOMS: Primary | ICD-10-CM

## 2025-03-04 LAB — POST-VOID RESIDUAL VOLUME, ML POC: 138 ML

## 2025-03-04 PROCEDURE — 99213 OFFICE O/P EST LOW 20 MIN: CPT

## 2025-03-04 PROCEDURE — 51798 US URINE CAPACITY MEASURE: CPT

## 2025-03-04 NOTE — ASSESSMENT & PLAN NOTE
Patient denies a known family history for prostate cancer  Patient's last PSA was performed 2/25/2025 and found to be 4.636.  Refer to PSA trend below.  ELLI performed today.  Palpate benign prostate.  Refer to physical exam findings.  Patient underwent multiparametric MRI of the prostate on 2/8/2024 which revealed PI-RADS category 2 with no suspicious lesions concerning for prostate cancer and a prostate volume of 60.5 g.  We discussed that the patient's PSA trend does continue to slowly rise.  However, given his prostate size this rise may be driven by his big prostate.  We discussed that since it has been a full year out since his last MRI, that we could consider undergoing a repeat multiparametric MRI of the prostate or continue to watch the PSA closely.  At this time, the patient defers undergoing a repeat prostate MRI and would prefer to continue to monitor the PSA.  Patient will undergo a repeat PSA in 6 months we will follow-up in the office at that time to review.  If the PSA rises once more then we will strongly consider repeating the multiparametric MRI of the prostate.    PSA Trend:  4.646 - 2/25/25  4.323 - 8/26/24  2.77 - 5/30/24  4.0 - 1/17/24  5.5 - 10/2022  3.17 - 9/7/22

## 2025-03-04 NOTE — PROGRESS NOTES
"3/4/2025      Assessment and Plan    69 y.o. male managed by Dr. Gross    Elevated PSA  Patient denies a known family history for prostate cancer  Patient's last PSA was performed 2/25/2025 and found to be 4.636.  Refer to PSA trend below.  ELLI performed today.  Palpate benign prostate.  Refer to physical exam findings.  Patient underwent multiparametric MRI of the prostate on 2/8/2024 which revealed PI-RADS category 2 with no suspicious lesions concerning for prostate cancer and a prostate volume of 60.5 g.  We discussed that the patient's PSA trend does continue to slowly rise.  However, given his prostate size this rise may be driven by his big prostate.  We discussed that since it has been a full year out since his last MRI, that we could consider undergoing a repeat multiparametric MRI of the prostate or continue to watch the PSA closely.  At this time, the patient defers undergoing a repeat prostate MRI and would prefer to continue to monitor the PSA.  Patient will undergo a repeat PSA in 6 months we will follow-up in the office at that time to review.  If the PSA rises once more then we will strongly consider repeating the multiparametric MRI of the prostate.    PSA Trend:  4.646 - 2/25/25  4.323 - 8/26/24  2.77 - 5/30/24  4.0 - 1/17/24  5.5 - 10/2022  3.17 - 9/7/22        History of Present Illness  Mario Bowser is a 69 y.o. male here for evaluation of history of elevated PSA and BPH with obstruction/lower urinary tract symptoms.  Patient was last seen in the office on 9/4/24. Patient previously denied a family history of prostate cancer, but did note a family history of colon cancer and \"bone cancer\".  Patient had a PSA as high as 5.5 on November 2022.  Patient had a PSA performed 1/17/2024 which returned at a value of 4.0.  Patient underwent a multiparametric MRI of the prostate on 2/8/2024 which revealed PI-RADS category 2 with no suspicious lesion concerning for prostate cancer revealed on MRI and a " "prostate volume of 68.5 g.  Repeat PSA testing performed 5/30/2024 returned at a value of 2.77.  Most recently the patient underwent PSA testing on 8/26/2024 which returned at a value of 4.323.  Today, the patient reports that he was lawnmower riding as he was cutting his lawn a day prior to him avoiding his repeat PSA testing and attributes this to his elevated PSA value.  Patient does note that his PSA performed on 5/30/2024 he did not perform any activities that may falsely elevate his PSA.  Otherwise, patient does not know feelings of incomplete bladder emptying but states that he has a good urinary stream and denies any worsening urinary urgency.         Review of Systems   Constitutional:  Negative for chills and fever.   HENT:  Negative for ear pain and sore throat.    Eyes:  Negative for pain and visual disturbance.   Respiratory:  Negative for cough and shortness of breath.    Cardiovascular:  Negative for chest pain and palpitations.   Gastrointestinal:  Negative for abdominal pain and vomiting.   Genitourinary:  Negative for decreased urine volume, difficulty urinating, dysuria, flank pain, frequency, hematuria and urgency.   Musculoskeletal:  Negative for arthralgias and back pain.   Skin:  Negative for color change and rash.   Neurological:  Negative for seizures and syncope.   All other systems reviewed and are negative.               Vitals  Vitals:    03/04/25 0738   BP: 126/74   BP Location: Left arm   Patient Position: Sitting   Cuff Size: Standard   Pulse: 56   SpO2: 99%   Weight: 85.3 kg (188 lb)   Height: 5' 10\" (1.778 m)       Physical Exam  Vitals reviewed.   Constitutional:       General: He is not in acute distress.     Appearance: Normal appearance. He is not ill-appearing.   HENT:      Head: Normocephalic and atraumatic.      Nose: Nose normal.   Eyes:      General: No scleral icterus.  Pulmonary:      Effort: No respiratory distress.   Abdominal:      General: Abdomen is flat. There is no " distension.      Palpations: Abdomen is soft.      Tenderness: There is no abdominal tenderness.   Genitourinary:     Comments: ELLI performed today.  Prostate estimated to be about 65 g and smooth bilaterally without nodularity or induration.  Musculoskeletal:         General: Normal range of motion.      Cervical back: Normal range of motion.   Skin:     General: Skin is warm.      Coloration: Skin is not jaundiced.   Neurological:      Mental Status: He is alert and oriented to person, place, and time.      Gait: Gait normal.   Psychiatric:         Mood and Affect: Mood normal.         Behavior: Behavior normal.           Past History  Past Medical History:   Diagnosis Date    Basal cell carcinoma     Melanoma (HCC)     Squamous cell skin cancer      Social History     Socioeconomic History    Marital status: /Civil Union     Spouse name: None    Number of children: None    Years of education: None    Highest education level: None   Occupational History    None   Tobacco Use    Smoking status: Never    Smokeless tobacco: Never   Vaping Use    Vaping status: Never Used   Substance and Sexual Activity    Alcohol use: Not Currently    Drug use: Never    Sexual activity: Yes     Partners: Female   Other Topics Concern    None   Social History Narrative    None     Social Drivers of Health     Financial Resource Strain: Not on file   Food Insecurity: Not on file   Transportation Needs: Not on file   Physical Activity: Not on file   Stress: Not on file   Social Connections: Not on file   Intimate Partner Violence: Not on file   Housing Stability: Not on file     Social History     Tobacco Use   Smoking Status Never   Smokeless Tobacco Never     Family History   Problem Relation Age of Onset    Rectal cancer Paternal Grandmother        The following portions of the patient's history were reviewed and updated as appropriate: allergies, current medications, past medical history, past social history, past surgical  "history and problem list.    Results  Recent Results (from the past hour)   POCT Measure PVR    Collection Time: 03/04/25  7:50 AM   Result Value Ref Range    POST-VOID RESIDUAL VOLUME, ML  mL   ]  Lab Results   Component Value Date    PSA 4.636 (H) 02/25/2025    PSA 4.323 (H) 08/26/2024    PSA 4.00 01/17/2024     No results found for: \"GLUCOSE\", \"CALCIUM\", \"NA\", \"K\", \"CO2\", \"CL\", \"BUN\", \"CREATININE\"  No results found for: \"WBC\", \"HGB\", \"HCT\", \"MCV\", \"PLT\"   "

## 2025-03-31 PROBLEM — N40.1 BPH WITH OBSTRUCTION/LOWER URINARY TRACT SYMPTOMS: Status: RESOLVED | Noted: 2024-09-04 | Resolved: 2025-03-31

## 2025-03-31 PROBLEM — N13.8 BPH WITH OBSTRUCTION/LOWER URINARY TRACT SYMPTOMS: Status: RESOLVED | Noted: 2024-09-04 | Resolved: 2025-03-31

## 2025-03-31 NOTE — ASSESSMENT & PLAN NOTE
11/2016 cardioversion  12/2026 atrial flutter ablation  Current medication regimen:  AC: None  AAD: None  Rate: None

## 2025-03-31 NOTE — PROGRESS NOTES
"Electrophysiology Follow Up  [facility]  [facility] [address], [place] [address]    Name: Mario Bowser  : 1955  MRN: [id number]    Assessment & Plan  Atypical atrial flutter (HCC)  2016 cardioversion  2026 atrial flutter ablation  Current medication regimen:  AC: None  AAD: None  Rate: None  Mitral valve insufficiency, unspecified etiology   Mild w/ preserved EF per prior ECHO  PAC (premature atrial contraction)  Not currently maintained on AV [place] blocking agent  BPH with obstruction/lower urinary tract symptoms  Follows w/ urology       Discussion/Plan:    Patient has a history of atrial flutter with prior ablation in  for which he is not currently maintained on cardiac medication    Since his last outpatient EP appointment he reports he has noted an occasional \"strong\" heart beat (infrequent, non-bothersome, not worsening), but otherwise has been feeling well overall and currently denies acute cardiac complaint    No EKG obtained in office today per patient preference (reports had an EKG yesterday at an outpatient clinic (our office is working on getting these results))    /82 w/ HR 46bpm (patient asymptomatic in regards to this)    Discussed afib/flutter and how there is chance of afib development in the future for patient's w/ known atrial flutter. Given this, long-term monitoring of his heart rate and rhythm are important.    We did discuss long-term monitoring options today such as smart watch, kardia device, pulse ox, and loop implantation.    No acute medication changes made today    Modifiable risk factors for atrial fibrillation were discussed today including weight loss, avoiding alcohol/tobacco products, and treatment of CHARLEEN/HTN/DM    Was going to consider ECHO for ongoing cardiac monitoring given previously noted mild mitral regurg, however he notes also having gotten an ECHO done yesterday - will see if our office can " "get these results as well    Patient has been instructed to follow up in our EP office in 1 year or as needed. He will call our office with any questions or concerns in the meantime.    Rhythm History:   Atrial fibrillation:      Atrial flutter:      SVT:      VT/VF/PVC:     Device history:   Pacemaker:     Defibrillator:     BIV PPM:     BIV ICD:     ILR:    Interim History/HPI:   Interim history: Mario Bowser is a 69 y.o. male with a PMH of atrial flutter, mitral regurg, PAC, and BPH.    He presents today for routine outpatient follow up given his history of atrial flutter. Since his last outpatient EP appointment he reports he has noted an occasional \"strong\" heart beat (infrequent, non-bothersome, not worsening), but otherwise has been feeling well overall and currently denies acute cardiac complaint.     EKG: Not obtained today per patient preference     Review of Systems   Constitutional:  Negative for activity change, appetite change, chills, fatigue and fever.   HENT:  Negative for nosebleeds.    Respiratory:  Negative for chest tightness and shortness of breath.    Cardiovascular:  Negative for chest pain, palpitations and leg swelling.        + occasional \"strong\" heart beat   Neurological:  Negative for dizziness, syncope, weakness and light-headedness.         OBJECTIVE:   Vitals:   There were no vitals taken for this visit.  There is no height or weight on file to calculate BMI.        Physical Exam:   Physical Exam  Constitutional:       General: He is not in acute distress.     Appearance: Normal appearance. He is not toxic-appearing.   HENT:      Head: Normocephalic and atraumatic.   Eyes:      General:         Right eye: No discharge.         Left eye: No discharge.   Cardiovascular:      Rate and Rhythm: Normal rate and regular rhythm.      Pulses: Normal pulses.   Pulmonary:      Effort: Pulmonary effort is normal.      Breath sounds: Normal breath sounds.   Musculoskeletal:      Right lower leg: " No edema.      Left lower leg: No edema.   Skin:     General: Skin is warm and dry.      Capillary Refill: Capillary refill takes less than 2 seconds.   Neurological:      Mental Status: He is alert.            Medications:      Current Outpatient Medications:     aspirin (Aspirin 81) 81 mg EC tablet, Take 81 mg by mouth daily, Disp: , Rfl:     fluorouracil (EFUDEX) 5 % cream, Apply topically 2 (two) times a day Apply 2x/day to scalp for 2 weeks and then after 2-4 week break, apply 2x/day to forearms for 2 weeks. If very irritating, apply vaseline and take a break for a few days. (Patient not taking: Reported on 2/26/2024), Disp: 40 g, Rfl: 3       Historical Information   Past Medical History:   Diagnosis Date    Basal cell carcinoma     Melanoma (HCC)     Squamous cell skin cancer        No past surgical history on file.    Social History     Substance and Sexual Activity   Alcohol Use Not Currently     Social History     Substance and Sexual Activity   Drug Use Never     Social History     Tobacco Use   Smoking Status Never   Smokeless Tobacco Never       Family History   Problem Relation Age of Onset    Rectal cancer Paternal Grandmother          Labs & Results:  Below is the patient's most recent value for Albumin, ALT, AST, BUN, Calcium, Chloride, Cholesterol, CO2, Creatinine, GFR, Glucose, HDL, Hematocrit, Hemoglobin, Hemoglobin A1C, LDL, Magnesium, Phosphorus, Platelets, Potassium, PSA, Sodium, Triglycerides, and WBC.   Lab Results   Component Value Date    PSA 4.636 (H) 02/25/2025     Note: for a comprehensive list of the patient's lab results, access the Results Review activity.

## 2025-04-10 ENCOUNTER — OFFICE VISIT (OUTPATIENT)
Dept: CARDIOLOGY CLINIC | Facility: CLINIC | Age: 70
End: 2025-04-10
Payer: COMMERCIAL

## 2025-04-10 VITALS — DIASTOLIC BLOOD PRESSURE: 82 MMHG | SYSTOLIC BLOOD PRESSURE: 112 MMHG | HEART RATE: 46 BPM

## 2025-04-10 DIAGNOSIS — I34.0 MITRAL VALVE INSUFFICIENCY, UNSPECIFIED ETIOLOGY: ICD-10-CM

## 2025-04-10 DIAGNOSIS — I48.4 ATYPICAL ATRIAL FLUTTER (HCC): Primary | ICD-10-CM

## 2025-04-10 DIAGNOSIS — N13.8 BPH WITH OBSTRUCTION/LOWER URINARY TRACT SYMPTOMS: ICD-10-CM

## 2025-04-10 DIAGNOSIS — N40.1 BPH WITH OBSTRUCTION/LOWER URINARY TRACT SYMPTOMS: ICD-10-CM

## 2025-04-10 DIAGNOSIS — I49.1 PAC (PREMATURE ATRIAL CONTRACTION): ICD-10-CM

## 2025-04-10 PROCEDURE — 99213 OFFICE O/P EST LOW 20 MIN: CPT
